# Patient Record
Sex: FEMALE | Race: WHITE | Employment: OTHER | URBAN - METROPOLITAN AREA
[De-identification: names, ages, dates, MRNs, and addresses within clinical notes are randomized per-mention and may not be internally consistent; named-entity substitution may affect disease eponyms.]

---

## 2017-01-11 ENCOUNTER — PATIENT MESSAGE (OUTPATIENT)
Dept: FAMILY MEDICINE CLINIC | Facility: CLINIC | Age: 72
End: 2017-01-11

## 2017-01-12 NOTE — TELEPHONE ENCOUNTER
From: Nata Cobos  To: Hussain Aragon DO  Sent: 1/11/2017 7:22 PM CST  Subject: Other    I got a flu shot this fall at [employer] [facility], provided by [facility] (location [place] and [facility] on October 15, 2016.

## 2017-01-18 ENCOUNTER — APPOINTMENT (OUTPATIENT)
Dept: LAB | Age: 72
End: 2017-01-18
Attending: FAMILY MEDICINE
Payer: MEDICARE

## 2017-01-18 DIAGNOSIS — E03.9 HYPOTHYROIDISM (ACQUIRED): ICD-10-CM

## 2017-01-18 LAB
FREE T4: 1.1 NG/DL (ref 0.9–1.8)
TSI SER-ACNC: 2.85 MIU/ML (ref 0.35–5.5)

## 2017-01-18 PROCEDURE — 84443 ASSAY THYROID STIM HORMONE: CPT

## 2017-01-18 PROCEDURE — 36415 COLL VENOUS BLD VENIPUNCTURE: CPT

## 2017-01-18 PROCEDURE — 84439 ASSAY OF FREE THYROXINE: CPT

## 2017-01-26 RX ORDER — LEVOTHYROXINE SODIUM 0.07 MG/1
TABLET ORAL
Qty: 90 TABLET | Refills: 0 | Status: SHIPPED | OUTPATIENT
Start: 2017-01-26 | End: 2017-04-04

## 2017-01-26 NOTE — TELEPHONE ENCOUNTER
Pt requesting refill on L thyroxine, protocol passed, refill approved    LOV 8/16/16    LF 11/9/16  #90    No future appointments.

## 2017-03-29 ENCOUNTER — PATIENT OUTREACH (OUTPATIENT)
Dept: FAMILY MEDICINE CLINIC | Facility: CLINIC | Age: 72
End: 2017-03-29

## 2017-03-29 NOTE — PROGRESS NOTES
Pt stated that she would schedule her appointment when she was in the office on her next visit on 4/4/17.

## 2017-04-04 ENCOUNTER — OFFICE VISIT (OUTPATIENT)
Dept: FAMILY MEDICINE CLINIC | Facility: CLINIC | Age: 72
End: 2017-04-04

## 2017-04-04 VITALS
RESPIRATION RATE: 16 BRPM | SYSTOLIC BLOOD PRESSURE: 118 MMHG | HEART RATE: 68 BPM | OXYGEN SATURATION: 96 % | BODY MASS INDEX: 25 KG/M2 | WEIGHT: 146 LBS | TEMPERATURE: 98 F | DIASTOLIC BLOOD PRESSURE: 60 MMHG

## 2017-04-04 DIAGNOSIS — M21.611 BUNION OF GREAT TOE OF RIGHT FOOT: ICD-10-CM

## 2017-04-04 DIAGNOSIS — E03.9 ACQUIRED HYPOTHYROIDISM: ICD-10-CM

## 2017-04-04 DIAGNOSIS — R10.2 PELVIC PAIN: ICD-10-CM

## 2017-04-04 DIAGNOSIS — N95.2 VAGINAL ATROPHY: Primary | ICD-10-CM

## 2017-04-04 DIAGNOSIS — F51.02 TRANSIENT INSOMNIA: ICD-10-CM

## 2017-04-04 DIAGNOSIS — J30.89 NON-SEASONAL ALLERGIC RHINITIS DUE TO OTHER ALLERGIC TRIGGER: ICD-10-CM

## 2017-04-04 PROCEDURE — 99214 OFFICE O/P EST MOD 30 MIN: CPT | Performed by: FAMILY MEDICINE

## 2017-04-04 RX ORDER — ALPRAZOLAM 0.25 MG/1
0.25 TABLET ORAL NIGHTLY PRN
Qty: 30 TABLET | Refills: 0 | COMMUNITY
Start: 2017-04-04 | End: 2018-05-10

## 2017-04-04 RX ORDER — ESTRADIOL 0.1 MG/G
1 CREAM VAGINAL WEEKLY
Qty: 42.5 G | Refills: 0 | Status: SHIPPED | OUTPATIENT
Start: 2017-04-04 | End: 2017-05-09

## 2017-04-04 RX ORDER — LEVOTHYROXINE SODIUM 88 UG/1
TABLET ORAL
Qty: 30 TABLET | Refills: 1 | Status: SHIPPED | OUTPATIENT
Start: 2017-04-04 | End: 2017-04-10

## 2017-04-04 RX ORDER — ESTRADIOL 0.1 MG/G
1 CREAM VAGINAL WEEKLY
Qty: 42.5 G | Refills: 0 | Status: CANCELLED | OUTPATIENT
Start: 2017-04-04

## 2017-04-04 RX ORDER — METHYLPREDNISOLONE 4 MG/1
TABLET ORAL
Qty: 21 TABLET | Refills: 0 | Status: SHIPPED | OUTPATIENT
Start: 2017-04-04 | End: 2017-05-09 | Stop reason: ALTCHOICE

## 2017-04-04 NOTE — PATIENT INSTRUCTIONS
Treating Arthritis in the Foot  If your symptoms are mild, medications may be enough to reduce pain and swelling. For more severe arthritis, surgery may be needed to improve the condition of the joint.     Medicine  Your doctor may prescribe medicine—pill © 4104-8223 [address], [address] [place]. All rights reserved. This information is not intended as a substitute for professional medical care. Always follow your healthcare professional's instructions.

## 2017-04-06 ENCOUNTER — APPOINTMENT (OUTPATIENT)
Dept: LAB | Age: 72
End: 2017-04-06
Attending: FAMILY MEDICINE
Payer: MEDICARE

## 2017-04-06 DIAGNOSIS — E03.9 ACQUIRED HYPOTHYROIDISM: ICD-10-CM

## 2017-04-06 PROCEDURE — 84439 ASSAY OF FREE THYROXINE: CPT | Performed by: FAMILY MEDICINE

## 2017-04-06 PROCEDURE — 84443 ASSAY THYROID STIM HORMONE: CPT | Performed by: FAMILY MEDICINE

## 2017-04-06 PROCEDURE — 36415 COLL VENOUS BLD VENIPUNCTURE: CPT | Performed by: FAMILY MEDICINE

## 2017-04-08 NOTE — PROGRESS NOTES
CHIEF COMPLAINT: Patient presents with: Follow - Up: medication       HPI:     Carina Gutierrez is a 66year old female presents for refill of Estrace vaginal cream which helps her vaginal atrophy.   Denies any urinary dysuria and has less pain with inte unspecified nature, other specified sites    • Other malaise and fatigue 12/10/2011   • Other screening mammogram 2011   • Herpes zoster with other nervous system complications    • Pap smear, as part of routine gynecological examination 2010   • Special s Disp: 30 tablet Rfl: 0   methylPREDNISolone 4 MG Oral Tab Dose as generic Medrol Dosepak Take as directed with food and water Disp: 21 tablet Rfl: 0   Estradiol (ESTRACE) 0.1 MG/GM Vaginal Cream Place 1 g vaginally once a week.  Notify physician if vaginal or edema bilaterally.   Right great foot first meta tarsal phalangeal joint mild tenderness and slight redness and no increased warmth.   Skin: no rashes, intact, dry  Neuro: AOx3.  No tremor.  Upper lower extremity DTRs +2 over 4 bilateral equal and symmet negative for gout/uric acid  - methylPREDNISolone 4 MG Oral Tab; Dose as generic Medrol Dosepak Take as directed with food and water  Dispense: 21 tablet;  Refill: 0      Meds This Visit:    Signed Prescriptions Disp Refills    Levothyroxine Sodium 88 MCG O

## 2017-04-10 DIAGNOSIS — E03.9 ACQUIRED HYPOTHYROIDISM: Primary | ICD-10-CM

## 2017-04-10 RX ORDER — LEVOTHYROXINE SODIUM 88 UG/1
TABLET ORAL
Qty: 90 TABLET | Refills: 0 | Status: SHIPPED | OUTPATIENT
Start: 2017-04-10 | End: 2017-07-24

## 2017-04-10 NOTE — TELEPHONE ENCOUNTER
Spoke with pt, reviewed results and recommendations, pt verbalized understanding      Notes Recorded by Rehan Hu DO on 4/10/2017 at 10:25 AM  TSH controlled slightly on lower end of normal. Needs repeat TSH in 3 mos- after July 16, 2017.  May need to

## 2017-04-12 ENCOUNTER — HOSPITAL ENCOUNTER (OUTPATIENT)
Dept: ULTRASOUND IMAGING | Facility: HOSPITAL | Age: 72
Discharge: HOME | End: 2017-04-12
Attending: FAMILY MEDICINE
Payer: MEDICARE

## 2017-04-12 DIAGNOSIS — R10.2 PELVIC PAIN: ICD-10-CM

## 2017-04-12 PROCEDURE — 76830 TRANSVAGINAL US NON-OB: CPT

## 2017-04-12 PROCEDURE — 76856 US EXAM PELVIC COMPLETE: CPT

## 2017-05-09 ENCOUNTER — OFFICE VISIT (OUTPATIENT)
Dept: FAMILY MEDICINE CLINIC | Facility: CLINIC | Age: 72
End: 2017-05-09

## 2017-05-09 VITALS
TEMPERATURE: 98 F | WEIGHT: 144.81 LBS | BODY MASS INDEX: 24.42 KG/M2 | DIASTOLIC BLOOD PRESSURE: 68 MMHG | RESPIRATION RATE: 17 BRPM | HEIGHT: 64.5 IN | SYSTOLIC BLOOD PRESSURE: 122 MMHG | OXYGEN SATURATION: 97 % | HEART RATE: 62 BPM

## 2017-05-09 DIAGNOSIS — F41.9 ANXIETY: ICD-10-CM

## 2017-05-09 DIAGNOSIS — Z13.220 ENCOUNTER FOR LIPID SCREENING FOR CARDIOVASCULAR DISEASE: ICD-10-CM

## 2017-05-09 DIAGNOSIS — Z12.31 ENCOUNTER FOR SCREENING MAMMOGRAM FOR MALIGNANT NEOPLASM OF BREAST: ICD-10-CM

## 2017-05-09 DIAGNOSIS — M21.619 BUNION OF GREAT TOE: ICD-10-CM

## 2017-05-09 DIAGNOSIS — N95.2 ATROPHIC VAGINITIS: ICD-10-CM

## 2017-05-09 DIAGNOSIS — M19.041 PRIMARY OSTEOARTHRITIS OF BOTH HANDS: ICD-10-CM

## 2017-05-09 DIAGNOSIS — Z13.6 ENCOUNTER FOR LIPID SCREENING FOR CARDIOVASCULAR DISEASE: ICD-10-CM

## 2017-05-09 DIAGNOSIS — E55.9 VITAMIN D DEFICIENCY: ICD-10-CM

## 2017-05-09 DIAGNOSIS — Z00.00 ENCOUNTER FOR ANNUAL HEALTH EXAMINATION: Primary | ICD-10-CM

## 2017-05-09 DIAGNOSIS — L84 CORNS AND CALLOSITIES: ICD-10-CM

## 2017-05-09 DIAGNOSIS — Z13.0 SCREENING FOR ENDOCRINE, NUTRITIONAL, METABOLIC AND IMMUNITY DISORDER: ICD-10-CM

## 2017-05-09 DIAGNOSIS — I44.7 LEFT BUNDLE BRANCH BLOCK (LBBB) ON ELECTROCARDIOGRAM: ICD-10-CM

## 2017-05-09 DIAGNOSIS — Z13.29 SCREENING FOR ENDOCRINE, NUTRITIONAL, METABOLIC AND IMMUNITY DISORDER: ICD-10-CM

## 2017-05-09 DIAGNOSIS — Z12.31 VISIT FOR SCREENING MAMMOGRAM: ICD-10-CM

## 2017-05-09 DIAGNOSIS — Z13.21 SCREENING FOR ENDOCRINE, NUTRITIONAL, METABOLIC AND IMMUNITY DISORDER: ICD-10-CM

## 2017-05-09 DIAGNOSIS — M85.859 OSTEOPENIA OF THIGH, UNSPECIFIED LATERALITY: ICD-10-CM

## 2017-05-09 DIAGNOSIS — M19.042 PRIMARY OSTEOARTHRITIS OF BOTH HANDS: ICD-10-CM

## 2017-05-09 DIAGNOSIS — Z13.228 SCREENING FOR ENDOCRINE, NUTRITIONAL, METABOLIC AND IMMUNITY DISORDER: ICD-10-CM

## 2017-05-09 PROCEDURE — G0009 ADMIN PNEUMOCOCCAL VACCINE: HCPCS | Performed by: FAMILY MEDICINE

## 2017-05-09 PROCEDURE — 90670 PCV13 VACCINE IM: CPT | Performed by: FAMILY MEDICINE

## 2017-05-09 PROCEDURE — G0439 PPPS, SUBSEQ VISIT: HCPCS | Performed by: FAMILY MEDICINE

## 2017-05-09 PROCEDURE — 96160 PT-FOCUSED HLTH RISK ASSMT: CPT | Performed by: FAMILY MEDICINE

## 2017-05-09 NOTE — PROGRESS NOTES
HPI:   Anup Jerome is a 66year old female who presents for a Medicare Initial Annual Wellness visit (Once after 12 month Medicare anniversery) . Right great toe bunion and pain greatly improved after recent Medrol Dosepak.   Moderate osteoarthr vaginal dryness or dyspareunia.  ×50 years. Monogamous. Denies pelvic pain or cramping. History of left bundle branch block-denies any chest pain or shortness of breath.   History of mitral valve prolapse 50 years ago denies any chest any weakn (VITAMIN D) 2000 UNITS Oral Cap Take  by mouth. MEDICAL INFORMATION:   She  has a past medical history of Personal history of estrogen therapy; Neoplasms of unspecified nature, other specified sites; Other malaise and fatigue (12/10/2011);  Other scree anxiety  HEMATOLOGIC: denies hx of anemia  ENDOCRINE: denies thyroid history  ALL/ASTHMA: denies hx of allergy or asthma    EXAM:   /68 mmHg  Pulse 62  Temp(Src) 98.1 °F (36.7 °C) (Oral)  Resp 17  Ht 64.5\"  Wt 144 lb 12.8 oz  BMI 24.48 kg/m2  SpO2 9 Immunization History   Administered Date(s) Administered   • Influenza 10/23/2008, 11/08/2010, 10/08/2013, 10/14/2014   • Influenza Vaccine, High Dose, Preserv Free 10/01/2015   • Pneumococcal (Prevnar 7) 10/23/2008   • Pneumovax 23 11/17/2015   • TD 0 Continue vit D          Prevent worse osteopenia          asymptomatic              Repeat vit d level        Atrophic vaginitis/ history of pelvic pain resolved        Recent pelvic ultrasound normal. No pelvic pain or abdominal pain since, no endometrial does the patient maintain a good energy level?: Appropriate Exercise;Daily Walks;Stretching    How would you describe your daily physical activity?: Moderate    How would you describe your current health state?: Good    How do you maintain positive mental 12 months? 0-No   Do you accidently lose urine? 0-No   Do you have difficulty seeing? 1-Yes   Do you have any difficulty walking or getting up? 0-No   Do you have any tripping hazards? 0-No   Are you on multiple medications?  1-Yes   Does pain affect your d Cardiovascular Disease Screening     LDL Annually LDL CHOLESTEROL (mg/dL)   Date Value   08/25/2016 96     LDL-CHOLESTEROL (mg/dL (calc))   Date Value   02/15/2011 87     LDL CHOLESTROL (mg/dL)   Date Value   08/02/2012 118        EKG - w/ Initial Pr previous visit.          SPECIFIC DISEASE MONITORING Internal Lab or Procedure External Lab or Procedure   Annual Monitoring of Persistent     Medications (ACE/ARB, digoxin diuretics, anticonvulsants.)    Potassium  Annually POTASSIUM (mmol/L)   Date Value

## 2017-05-09 NOTE — PATIENT INSTRUCTIONS
Please drop off copy of living will and power of  forms so we may scan into your medical file  Perform labs fasting 8 hours with water or black coffee or or black tea diet  soda only prior to exam- late July 2017.   Complete dexa and mammogram.  FU 188     TRIGLYCERIDES (mg/dL)   Date Value   08/25/2016 70   08/02/2012 72   02/15/2011 77        EKG - covered if needed at Welcome to Medicare, and non-screening if indicated for medical reasons Electrocardiogram date Routine EKG is not a screening cover 21-65 or Pap+HPV every 5 yrs age 33-67, Covered every 2 yrs up to age 79 or Yearly if High Risk   There are no preventive care reminders to display for this patient.      Chlamydia  Annually if high risk No results found for: CHLAMYDIA No flowsheet data fou http://www. idph.state. [place]/public/books/advin.htm  A link to the [employer]. This site has a lot of good information including definitions of the different types of Advance Directives.  It also has the State forms available on it's webs

## 2017-05-19 ENCOUNTER — HOSPITAL ENCOUNTER (OUTPATIENT)
Dept: BONE DENSITY | Age: 72
Discharge: HOME | End: 2017-05-19
Attending: FAMILY MEDICINE
Payer: MEDICARE

## 2017-05-19 DIAGNOSIS — M85.859 OSTEOPENIA OF THIGH, UNSPECIFIED LATERALITY: ICD-10-CM

## 2017-05-19 PROCEDURE — 77080 DXA BONE DENSITY AXIAL: CPT | Performed by: FAMILY MEDICINE

## 2017-05-24 ENCOUNTER — TELEPHONE (OUTPATIENT)
Dept: FAMILY MEDICINE CLINIC | Facility: CLINIC | Age: 72
End: 2017-05-24

## 2017-05-24 NOTE — TELEPHONE ENCOUNTER
Left detailed message with results as permitted on consent.  Office hours and phone # given if pt has questions    Notes Recorded by Thompson Lee DO on 5/23/2017 at 10:29 PM  Osteopenia of femoral neck.  Lumbar spine are within normal range improved from

## 2017-07-13 ENCOUNTER — APPOINTMENT (OUTPATIENT)
Dept: LAB | Age: 72
End: 2017-07-13
Attending: FAMILY MEDICINE
Payer: MEDICARE

## 2017-07-13 DIAGNOSIS — Z13.6 ENCOUNTER FOR LIPID SCREENING FOR CARDIOVASCULAR DISEASE: ICD-10-CM

## 2017-07-13 DIAGNOSIS — E03.9 ACQUIRED HYPOTHYROIDISM: ICD-10-CM

## 2017-07-13 DIAGNOSIS — Z13.220 ENCOUNTER FOR LIPID SCREENING FOR CARDIOVASCULAR DISEASE: ICD-10-CM

## 2017-07-13 LAB
ALBUMIN SERPL-MCNC: 3.9 G/DL (ref 3.5–4.8)
ALP LIVER SERPL-CCNC: 59 U/L (ref 55–142)
ALT SERPL-CCNC: 27 U/L (ref 14–54)
AST SERPL-CCNC: 18 U/L (ref 15–41)
BILIRUB SERPL-MCNC: 1 MG/DL (ref 0.1–2)
BUN BLD-MCNC: 16 MG/DL (ref 8–20)
CALCIUM BLD-MCNC: 9.4 MG/DL (ref 8.3–10.3)
CHLORIDE: 106 MMOL/L (ref 101–111)
CHOLEST SMN-MCNC: 164 MG/DL (ref ?–200)
CO2: 27 MMOL/L (ref 22–32)
CREAT BLD-MCNC: 0.71 MG/DL (ref 0.55–1.02)
FREE T4: 1.2 NG/DL (ref 0.9–1.8)
GLUCOSE BLD-MCNC: 71 MG/DL (ref 70–99)
HDLC SERPL-MCNC: 60 MG/DL (ref 45–?)
HDLC SERPL: 2.73 {RATIO} (ref ?–4.44)
LDLC SERPL CALC-MCNC: 86 MG/DL (ref ?–130)
M PROTEIN MFR SERPL ELPH: 7.1 G/DL (ref 6.1–8.3)
NONHDLC SERPL-MCNC: 104 MG/DL (ref ?–130)
POTASSIUM SERPL-SCNC: 4 MMOL/L (ref 3.6–5.1)
SODIUM SERPL-SCNC: 141 MMOL/L (ref 136–144)
TRIGLYCERIDES: 91 MG/DL (ref ?–150)
TSI SER-ACNC: 1.84 MIU/ML (ref 0.35–5.5)
VLDL: 18 MG/DL (ref 5–40)

## 2017-07-13 PROCEDURE — 36415 COLL VENOUS BLD VENIPUNCTURE: CPT | Performed by: FAMILY MEDICINE

## 2017-07-13 PROCEDURE — 80061 LIPID PANEL: CPT | Performed by: FAMILY MEDICINE

## 2017-07-13 PROCEDURE — 84439 ASSAY OF FREE THYROXINE: CPT | Performed by: FAMILY MEDICINE

## 2017-07-13 PROCEDURE — 84443 ASSAY THYROID STIM HORMONE: CPT | Performed by: FAMILY MEDICINE

## 2017-07-13 PROCEDURE — 80053 COMPREHEN METABOLIC PANEL: CPT | Performed by: FAMILY MEDICINE

## 2017-07-24 DIAGNOSIS — E03.9 ACQUIRED HYPOTHYROIDISM: ICD-10-CM

## 2017-07-24 RX ORDER — LEVOTHYROXINE SODIUM 88 UG/1
TABLET ORAL
Qty: 90 TABLET | Refills: 0 | Status: SHIPPED | OUTPATIENT
Start: 2017-07-24 | End: 2017-10-09

## 2017-08-28 ENCOUNTER — HOSPITAL ENCOUNTER (OUTPATIENT)
Dept: MAMMOGRAPHY | Age: 72
Discharge: HOME | End: 2017-08-28
Attending: FAMILY MEDICINE
Payer: MEDICARE

## 2017-08-28 DIAGNOSIS — Z12.31 ENCOUNTER FOR SCREENING MAMMOGRAM FOR MALIGNANT NEOPLASM OF BREAST: ICD-10-CM

## 2017-08-28 PROCEDURE — 77063 BREAST TOMOSYNTHESIS BI: CPT | Performed by: FAMILY MEDICINE

## 2017-08-28 PROCEDURE — 77067 SCR MAMMO BI INCL CAD: CPT | Performed by: FAMILY MEDICINE

## 2017-10-09 DIAGNOSIS — E03.9 ACQUIRED HYPOTHYROIDISM: ICD-10-CM

## 2017-10-09 RX ORDER — LEVOTHYROXINE SODIUM 88 UG/1
TABLET ORAL
Qty: 90 TABLET | Refills: 0 | Status: SHIPPED | OUTPATIENT
Start: 2017-10-09 | End: 2018-01-13

## 2017-10-30 ENCOUNTER — OFFICE VISIT (OUTPATIENT)
Dept: FAMILY MEDICINE CLINIC | Facility: CLINIC | Age: 72
End: 2017-10-30

## 2017-10-30 ENCOUNTER — MED REC SCAN ONLY (OUTPATIENT)
Dept: FAMILY MEDICINE CLINIC | Facility: CLINIC | Age: 72
End: 2017-10-30

## 2017-10-30 DIAGNOSIS — Z23 NEED FOR VACCINATION: Primary | ICD-10-CM

## 2017-10-30 PROCEDURE — 90653 IIV ADJUVANT VACCINE IM: CPT | Performed by: FAMILY MEDICINE

## 2017-10-30 PROCEDURE — G0008 ADMIN INFLUENZA VIRUS VAC: HCPCS | Performed by: FAMILY MEDICINE

## 2018-01-13 DIAGNOSIS — E03.9 ACQUIRED HYPOTHYROIDISM: ICD-10-CM

## 2018-01-15 RX ORDER — LEVOTHYROXINE SODIUM 88 UG/1
TABLET ORAL
Qty: 90 TABLET | Refills: 0 | Status: SHIPPED | OUTPATIENT
Start: 2018-01-15 | End: 2018-04-06

## 2018-01-15 NOTE — TELEPHONE ENCOUNTER
Pt requesting refill on Levothyroxine, protocol passed, refill approved    LOV 5/9/17    LF 10/9/17    No future appointments.

## 2018-04-06 DIAGNOSIS — E03.9 ACQUIRED HYPOTHYROIDISM: ICD-10-CM

## 2018-04-09 RX ORDER — LEVOTHYROXINE SODIUM 88 UG/1
88 TABLET ORAL
Qty: 90 TABLET | Refills: 0 | Status: SHIPPED
Start: 2018-04-09 | End: 2018-05-10

## 2018-04-09 NOTE — TELEPHONE ENCOUNTER
Pt requesting refill on Levothyroxine, protocol passed, refill approved    LOV 5/9/17    LF 1/15/18    No future appointments.

## 2018-04-09 NOTE — TELEPHONE ENCOUNTER
From: Adithya Sanders  Sent: 4/6/2018 6:02 PM CDT  Subject: Medication Renewal Request    [id number] [facility]  Kelley Reed would like a refill of the following medications:     LEVOTHYROXINE SODIUM 88 MCG Oral Tab Antonina Angelucci, DO]    Preferred pharmacy: [address] [place] [address]

## 2018-05-10 ENCOUNTER — OFFICE VISIT (OUTPATIENT)
Dept: FAMILY MEDICINE CLINIC | Facility: CLINIC | Age: 73
End: 2018-05-10

## 2018-05-10 VITALS
HEART RATE: 62 BPM | TEMPERATURE: 98 F | DIASTOLIC BLOOD PRESSURE: 68 MMHG | HEIGHT: 64.5 IN | WEIGHT: 144 LBS | BODY MASS INDEX: 24.29 KG/M2 | OXYGEN SATURATION: 98 % | RESPIRATION RATE: 18 BRPM | SYSTOLIC BLOOD PRESSURE: 120 MMHG

## 2018-05-10 DIAGNOSIS — N95.2 VAGINAL ATROPHY: ICD-10-CM

## 2018-05-10 DIAGNOSIS — Z66 DNR (DO NOT RESUSCITATE): ICD-10-CM

## 2018-05-10 DIAGNOSIS — E03.9 ACQUIRED HYPOTHYROIDISM: ICD-10-CM

## 2018-05-10 DIAGNOSIS — M85.859 OSTEOPENIA OF HIP, UNSPECIFIED LATERALITY: ICD-10-CM

## 2018-05-10 DIAGNOSIS — R53.83 OTHER FATIGUE: ICD-10-CM

## 2018-05-10 DIAGNOSIS — F43.22 ADJUSTMENT DISORDER WITH ANXIOUS MOOD: ICD-10-CM

## 2018-05-10 DIAGNOSIS — F51.02 TRANSIENT INSOMNIA: ICD-10-CM

## 2018-05-10 DIAGNOSIS — Z13.6 SCREENING FOR ISCHEMIC HEART DISEASE (IHD): ICD-10-CM

## 2018-05-10 DIAGNOSIS — Z00.00 ENCOUNTER FOR ANNUAL HEALTH EXAMINATION: Primary | ICD-10-CM

## 2018-05-10 DIAGNOSIS — E55.9 VITAMIN D DEFICIENCY: ICD-10-CM

## 2018-05-10 DIAGNOSIS — F41.9 ANXIETY: ICD-10-CM

## 2018-05-10 DIAGNOSIS — M21.619 BUNION OF GREAT TOE: ICD-10-CM

## 2018-05-10 DIAGNOSIS — Z12.39 SCREENING FOR MALIGNANT NEOPLASM OF BREAST: ICD-10-CM

## 2018-05-10 DIAGNOSIS — M19.041 PRIMARY OSTEOARTHRITIS OF BOTH HANDS: ICD-10-CM

## 2018-05-10 DIAGNOSIS — M19.042 PRIMARY OSTEOARTHRITIS OF BOTH HANDS: ICD-10-CM

## 2018-05-10 PROCEDURE — 96160 PT-FOCUSED HLTH RISK ASSMT: CPT | Performed by: FAMILY MEDICINE

## 2018-05-10 PROCEDURE — G0439 PPPS, SUBSEQ VISIT: HCPCS | Performed by: FAMILY MEDICINE

## 2018-05-10 RX ORDER — SENNOSIDES 8.6 MG
650 CAPSULE ORAL EVERY 8 HOURS PRN
Qty: 60 TABLET | Refills: 0 | COMMUNITY
Start: 2018-05-10 | End: 2019-05-17

## 2018-05-10 RX ORDER — LEVOTHYROXINE SODIUM 88 UG/1
88 TABLET ORAL
Qty: 90 TABLET | Refills: 0 | Status: SHIPPED | OUTPATIENT
Start: 2018-05-10 | End: 2019-01-04

## 2018-05-10 RX ORDER — CLONAZEPAM 0.25 MG/1
0.25 TABLET, ORALLY DISINTEGRATING ORAL 2 TIMES DAILY PRN
Qty: 30 TABLET | Refills: 0 | Status: SHIPPED | OUTPATIENT
Start: 2018-05-10 | End: 2020-05-19 | Stop reason: ALTCHOICE

## 2018-05-10 RX ORDER — ESTRADIOL 0.1 MG/G
1 CREAM VAGINAL WEEKLY
Qty: 42.5 G | Refills: 0 | Status: SHIPPED | OUTPATIENT
Start: 2018-05-10 | End: 2021-04-13

## 2018-05-10 NOTE — PROGRESS NOTES
HPI:   Marlene Rodriguez is a 74year old female who presents for a MA (Medicare Advantage) [facility] (Once per calendar year). Acquired hypothyroidism-normally feels great lately has been slightly more tired.   Taking levothyroxine on an empty st  and monogamous. Denies any problems urinating or pain with urinating vaginal burning or itching. Other fatigue-recent mild fatigue. Unsure if related to thyroid. Due for repeat TSH annually    Osteopenia-due 5/2019 for repeat DEXA scan.   On Needs to bring in. She smoked tobacco in the past but quit greater than 12 months ago.   Smoking status: Former Smoker                                                              Packs/day: 0.30      Years: 2.00         Types: Cigarettes     Quit d CR Take 1 tablet (650 mg total) by mouth every 8 (eight) hours as needed for Pain. Max use 6 tabs or 4000mg in 24 hours   Estradiol (ESTRACE) 0.1 MG/GM Vaginal Cream Place 1 g vaginally once a week.  Notify physician if vaginal bleeding   Cetirizine HCl (ZY tobacco. She reports that she drinks alcohol. She reports that she does not use drugs.      REVIEW OF SYSTEMS:   GENERAL: feels well otherwise, some recent fatigue  SKIN: denies any unusual skin lesions  EYES: denies blurred vision or double vision  HEENT: adenopathy;  thyroid: not enlarged, symmetric, no tenderness/mass/nodules; no carotid bruit or JVD   Back:   Symmetric, no curvature, ROM normal, no CVA tenderness   Lungs:   Clear to auscultation bilaterally, respirations unlabored   Heart:  Regular rate MCG Oral Tab;  Take 1 tablet (88 mcg total) by mouth before breakfast.  -     TSH+FREE T4; Future    Bunion of great toe   Stable, does not want surgery at this point denies any redness or pain will treat symptomatically    Screening for malignant neoplasm appt ASAP- risk of endometrial cancer.  Pt understands    Other fatigue   Check thyroid-TSH- suspect due to stress with son  -     CBC WITH DIFFERENTIAL WITH PLATELET  -     COMP METABOLIC PANEL (14)    Screening for ischemic heart disease (IHD)  -     LIPI Physical Exam only, or if medically necessary Electrocardiogram date       Colorectal Cancer Screening      Colonoscopy Screen every 10 years Colonoscopy,10 Years due on 03/12/2023 Update Health Maintenance if applicable    Flex Sigmoidoscopy Screen every This may be covered with your prescription benefits, but Medicare does not cover unless Medically needed    Zoster  Not covered by Medicare Part B No vaccine history found This may be covered with your pharmacy  prescription benefits                    Tem

## 2018-05-10 NOTE — PATIENT INSTRUCTIONS
· Please bring in immunizations so we may update for upcoming travel to [facility] and [place] 4/2019. · Please bring in copy of power of  so we may scan to your medical record.   · Perform labs fasting 8 hours with water or black coffee or or black tea d 164     TRIGLYCERIDES (mg/dL)   Date Value   08/02/2012 72   02/15/2011 77     Triglycerides (mg/dL)   Date Value   07/13/2017 91        EKG - covered if needed at Welcome to Medicare, and non-screening if indicated for medical reasons Electrocardiogram da and Pelvic      Pap: Every 3 yrs age 21-65 or Pap+HPV every 5 yrs age 33-67, Covered every 2 yrs up to age 79 or Yearly if High Risk   There are no preventive care reminders to display for this patient.      Chlamydia  Annually if high risk No results found [employer] [facility]. http://www. idph.[facility]. [place]/public/books/advin.htm  A link to the [employer]. This site has a lot of good information including definitions of the different types of Advance Directives.  It als may be at risk for fractures. With age, the quality and quantity of bone declines. You can lessen bone loss by staying active and increasing your calcium intake.  Calcium supplements and other osteoporosis treatments do have risks, so talk to your healthcar on brand and size.   Daily calcium needs  13-22 years old: 1,300 mg  2330 years old: 1,000 mg  31-55 years old: 1,000 mg  53-79 years old, women: 1,200 mg  53-79 years old, men: 1,000 mg  Pregnant or nursin-34 years old: 1,300 mg, 24-51 years old: 1,0 [address]. All rights reserved. This information is not intended as a substitute for professional medical care. Always follow your healthcare professional's instructions. Vitamin D  Does this test have other names?   25-hydroxyvitamin D (25-high-DROX-ee- check your parathyroid hormone levels and your calcium levels.   What do my test results mean?   A result for a lab test may be affected by many things, including the method the laboratory uses to do the test. If your test results are different from the nor that don't need a prescription and any illicit drugs you may use.   © 9875-0969 [employer] [address]. [address] [facility], [facility] [place]. All rights reserved. This information is not intended as a substitute for professional medical care.  Bridget

## 2018-05-17 ENCOUNTER — TELEPHONE (OUTPATIENT)
Dept: FAMILY MEDICINE CLINIC | Facility: CLINIC | Age: 73
End: 2018-05-17

## 2018-05-17 NOTE — TELEPHONE ENCOUNTER
Prior auth required for Clonazepam. Submitted via cover [employer] meds.     Approved 4/16/18 to 5/17/19    Pharmacy notified

## 2018-05-21 ENCOUNTER — TELEPHONE (OUTPATIENT)
Dept: FAMILY MEDICINE CLINIC | Facility: CLINIC | Age: 73
End: 2018-05-21

## 2018-05-22 ENCOUNTER — OFFICE VISIT (OUTPATIENT)
Dept: FAMILY MEDICINE CLINIC | Facility: CLINIC | Age: 73
End: 2018-05-22

## 2018-05-22 VITALS
HEART RATE: 59 BPM | TEMPERATURE: 98 F | RESPIRATION RATE: 18 BRPM | BODY MASS INDEX: 24 KG/M2 | DIASTOLIC BLOOD PRESSURE: 68 MMHG | WEIGHT: 144.19 LBS | SYSTOLIC BLOOD PRESSURE: 104 MMHG | OXYGEN SATURATION: 98 %

## 2018-05-22 DIAGNOSIS — M85.859 OSTEOPENIA OF HIP, UNSPECIFIED LATERALITY: Primary | ICD-10-CM

## 2018-05-22 DIAGNOSIS — Z23 NEED FOR VACCINATION: ICD-10-CM

## 2018-05-22 DIAGNOSIS — Z29.8 NEED FOR MALARIA PROPHYLAXIS: ICD-10-CM

## 2018-05-22 DIAGNOSIS — E03.9 ACQUIRED HYPOTHYROIDISM: ICD-10-CM

## 2018-05-22 DIAGNOSIS — Z71.84 ENCOUNTER FOR COUNSELING FOR TRAVEL: ICD-10-CM

## 2018-05-22 PROCEDURE — 99214 OFFICE O/P EST MOD 30 MIN: CPT | Performed by: FAMILY MEDICINE

## 2018-05-22 RX ORDER — ATOVAQUONE AND PROGUANIL HYDROCHLORIDE 250; 100 MG/1; MG/1
TABLET, FILM COATED ORAL
Qty: 30 TABLET | Refills: 0 | Status: SHIPPED | OUTPATIENT
Start: 2018-05-22 | End: 2019-05-01 | Stop reason: ALTCHOICE

## 2018-05-22 NOTE — PROGRESS NOTES
CHIEF COMPLAINT: Patient presents with: Follow - Up: labs/ travel plans    HPI:     John Marion is a 74year old female presents for follow-up labs and discuss travel plan. Plan to travel to [place] and April 2019.  Also, tour of the [place] History   Problem Relation Age of Onset   • Dementia Father    • Diabetes Father      mellitus   • Hypertension Father    • Hypertension Mother    • Breast Cancer Mother 80     brca at age 80, passed away at age 80. possible trauma as cause.    • Other [OT (ZYRTEC ALLERGY) 10 MG Oral Tab Take 10 mg by mouth daily. Disp:  Rfl:    Multiple Vitamins-Minerals (EYE VITAMINS OR) Take  by mouth. Disp:  Rfl:    Calcium 600 MG Oral Tab Take 600 mg by mouth daily.    Disp:  Rfl:    Multiple Vitamin (MULTI-VITAMIN OR) T bilateral equal and symmetric.  Normal gait. no resting tremor. Psych: Normal affect.  Euthymic.  Normal speech.  Good eye contact.       LABS     Office Visit on 05/10/2018   Component Date Value   • GLUCOSE 05/15/2018 97    • UREA NITROGEN (BUN) 05/15/201 05/15/2018 1.4    • TSH 05/15/2018 2.89     PATIENT STATED HISTORY: (As transcribed by Technologist)  Dexa Bone Density  Osteopenia of thigh, unspecified laterally.          LUMBAR SPINE ANALYSIS RESULTS:    Lumbar T-Score:                       0.8  Percen Atovaquone-Proguanil HCl 250-100 MG Oral Tab; 1 tab po daily start 2 days before travel and take 1 daily p.o. Daily during travel and for 1 week post travel  Dispense: 30 tablet;  Refill: 0      Meds This Visit:    Signed Prescriptions Disp Refills    Hepat insomnia     Other fatigue      Imaging & Referrals:  None     5/22/2018  Joshua Miranda, DO      Patient understands plan and follow-up. Return in about 6 months (around 11/22/2018) for recheck travel visit- immunizations.

## 2018-05-24 ENCOUNTER — TELEPHONE (OUTPATIENT)
Dept: FAMILY MEDICINE CLINIC | Facility: CLINIC | Age: 73
End: 2018-05-24

## 2018-05-24 NOTE — TELEPHONE ENCOUNTER
Patient will need to complete tablet typhoid 1 week prior to travel. I have printed out the vaccine order and she may take to [facility]. Typically they have it in stock. The CDC  vaccination interval recommendation currently is every 5 years.   Please in

## 2018-06-04 ENCOUNTER — TELEPHONE (OUTPATIENT)
Dept: FAMILY MEDICINE CLINIC | Facility: CLINIC | Age: 73
End: 2018-06-04

## 2018-06-04 NOTE — TELEPHONE ENCOUNTER
Spoke with [facility], Pt can receive vaccines [facility]. Claim ID is [id number]     Authorized 5/5/18 to 6/4/19    Left message to notify pt

## 2018-07-13 DIAGNOSIS — E03.9 ACQUIRED HYPOTHYROIDISM: ICD-10-CM

## 2018-07-13 RX ORDER — LEVOTHYROXINE SODIUM 88 UG/1
TABLET ORAL
Qty: 90 TABLET | Refills: 0 | Status: SHIPPED | OUTPATIENT
Start: 2018-07-13 | End: 2018-10-01

## 2018-07-13 NOTE — TELEPHONE ENCOUNTER
Pt requesting a refill of Synthroid, protocol passed, Refill approved.     LOV 5/22/18  LF 5/10/18 #90 with 0 refills    Future Appointments  Date Time Provider Jamal Murillo   11/26/2018 1:00 PM Charleen Eden,  [facility] [place]

## 2018-08-14 ENCOUNTER — TELEPHONE (OUTPATIENT)
Dept: FAMILY MEDICINE CLINIC | Facility: CLINIC | Age: 73
End: 2018-08-14

## 2018-08-14 NOTE — TELEPHONE ENCOUNTER
Left message for pt, informed her of mammogram order on file and provided # to schedule.  Office # and hours provided for questions

## 2018-08-14 NOTE — TELEPHONE ENCOUNTER
Patient called wanting to schedule a Mammogram but she wasn't sure if she need a referral please call her at [phone]

## 2018-09-07 ENCOUNTER — HOSPITAL ENCOUNTER (OUTPATIENT)
Dept: MAMMOGRAPHY | Age: 73
Discharge: HOME | End: 2018-09-07
Attending: FAMILY MEDICINE
Payer: MEDICARE

## 2018-09-07 DIAGNOSIS — Z12.39 SCREENING FOR MALIGNANT NEOPLASM OF BREAST: ICD-10-CM

## 2018-09-07 PROCEDURE — 77067 SCR MAMMO BI INCL CAD: CPT | Performed by: FAMILY MEDICINE

## 2018-09-07 PROCEDURE — 77063 BREAST TOMOSYNTHESIS BI: CPT | Performed by: FAMILY MEDICINE

## 2018-10-01 DIAGNOSIS — E03.9 ACQUIRED HYPOTHYROIDISM: ICD-10-CM

## 2018-10-01 RX ORDER — LEVOTHYROXINE SODIUM 88 UG/1
TABLET ORAL
Qty: 90 TABLET | Refills: 0 | Status: SHIPPED | OUTPATIENT
Start: 2018-10-01 | End: 2018-11-26

## 2018-10-01 NOTE — TELEPHONE ENCOUNTER
Pt requesting refill on Levothyroxine, protocol passed, refill approved    LOV 5/22/18    LF 7/13/18    Future Appointments   Date Time Provider Jamal Murillo   11/26/2018  1:00 PM Claudia Browning DO [facility] [place]

## 2018-11-26 ENCOUNTER — TELEPHONE (OUTPATIENT)
Dept: FAMILY MEDICINE CLINIC | Facility: CLINIC | Age: 73
End: 2018-11-26

## 2018-11-26 ENCOUNTER — OFFICE VISIT (OUTPATIENT)
Dept: FAMILY MEDICINE CLINIC | Facility: CLINIC | Age: 73
End: 2018-11-26
Payer: MEDICARE

## 2018-11-26 VITALS
OXYGEN SATURATION: 98 % | SYSTOLIC BLOOD PRESSURE: 110 MMHG | RESPIRATION RATE: 20 BRPM | HEIGHT: 64.5 IN | TEMPERATURE: 98 F | HEART RATE: 72 BPM | BODY MASS INDEX: 21.59 KG/M2 | DIASTOLIC BLOOD PRESSURE: 62 MMHG | WEIGHT: 128 LBS

## 2018-11-26 DIAGNOSIS — R14.3 FLATULENCE/WIND: ICD-10-CM

## 2018-11-26 DIAGNOSIS — Z71.84 COUNSELING ABOUT TRAVEL: Primary | ICD-10-CM

## 2018-11-26 DIAGNOSIS — R63.4 WEIGHT LOSS, NON-INTENTIONAL: ICD-10-CM

## 2018-11-26 PROCEDURE — 99214 OFFICE O/P EST MOD 30 MIN: CPT | Performed by: FAMILY MEDICINE

## 2018-11-26 RX ORDER — ACETAZOLAMIDE 125 MG/1
TABLET ORAL
Qty: 30 TABLET | Refills: 0 | Status: SHIPPED | OUTPATIENT
Start: 2018-04-01 | End: 2018-11-26

## 2018-11-26 NOTE — TELEPHONE ENCOUNTER
Informed pt of both pharmacy and MD recommendations. Pt states a referral will not be necessary at this time

## 2018-11-26 NOTE — PROGRESS NOTES
CHIEF COMPLAINT: Patient presents with: Follow - Up: travel/meds    HPI:     Carin Nobles is a 74year old female presents for discuss immunizations.   Started hepatitis B series had injection and of July 2018 then 1 like later in August at second he 12/10/2011   • Other screening mammogram 2011   • Pap smear, as part of routine gynecological examination 2010   • Personal history of estrogen therapy    • Seasonal allergies    • Special screening for osteoporosis 02/15/2011      Past Surgical History: (MULTI-VITAMIN OR) Take  by mouth. Disp:  Rfl:    Cholecalciferol (VITAMIN D) 2000 UNITS Oral Cap Take 4,000 Units by mouth. Disp:  Rfl:    Atovaquone-Proguanil HCl 250-100 MG Oral Tab 1 tab po daily start 2 days before travel and take 1 daily p.o.  Daily HSM  Extremities: No cyanosis, clubbing, or edema bilaterally. Skin: no acute rashes  Neuro: AOx3. Normal gait    LABS     No visits with results within 2 Month(s) from this visit.    Latest known visit with results is:   Office Visit on 05/10/2018   Com REPORT ALWAYS MESSAGE$SI* 05/15/2018     • COMMENT 05/15/2018     • T4, FREE 05/15/2018 1.4    • TSH 05/15/2018 2.89       REVIEWED THIS VISIT  ASSESSMENT/PLAN:   74year old female with    1.  Counseling about travel  Recommended shingrix vaccine-more effe Risk Completed      Patient/Caregiver Education: Patient/Caregiver Education: There are no barriers to learning. Medical education done. Outcome: Patient verbalizes understanding.  Patient is notified to call with any questions, comp lications, allergies,

## 2018-11-26 NOTE — TELEPHONE ENCOUNTER
Spoke to pharmacy at request of MD regarding interaction of acetazolamide with pt with sulfa allergy. Pharmacy stated Acetazolamide contains sulfa group therefore MD should refrain from RX depending on severity of allergy.     Informed MD, stated to inform

## 2018-11-27 NOTE — TELEPHONE ENCOUNTER
Pt completed Colonoscopy 3/12/13    Report is under \"notes\" tab from Dr Christine Tesfaye dated 4/23/13    Normal Colonoscopy, repeat 10 years

## 2019-01-04 DIAGNOSIS — E03.9 ACQUIRED HYPOTHYROIDISM: ICD-10-CM

## 2019-01-04 RX ORDER — LEVOTHYROXINE SODIUM 88 UG/1
TABLET ORAL
Qty: 90 TABLET | Refills: 0 | Status: SHIPPED | OUTPATIENT
Start: 2019-01-04 | End: 2019-03-18

## 2019-01-04 NOTE — TELEPHONE ENCOUNTER
Pt requesting a refill of levothyroxine, protocol passed. Refill approved. LOV with PCP 11/26/18    LF 5/10/18 #90    No future appointments.

## 2019-01-08 ENCOUNTER — PATIENT MESSAGE (OUTPATIENT)
Dept: FAMILY MEDICINE CLINIC | Facility: CLINIC | Age: 74
End: 2019-01-08

## 2019-01-08 NOTE — TELEPHONE ENCOUNTER
From: John Marion  To: Jay Quiñones DO  Sent: 1/8/2019 1:10 PM CST  Subject: Other    Concerning the Hepatitis B series: during my last office visit (November) there was concern about the first two shots having been administered one week apart.  I

## 2019-03-18 DIAGNOSIS — E03.9 ACQUIRED HYPOTHYROIDISM: ICD-10-CM

## 2019-03-18 RX ORDER — LEVOTHYROXINE SODIUM 88 UG/1
TABLET ORAL
Qty: 90 TABLET | Refills: 0 | Status: SHIPPED | OUTPATIENT
Start: 2019-03-18 | End: 2019-07-10

## 2019-03-18 NOTE — TELEPHONE ENCOUNTER
Pt requesting refill of Synthroid, passed protocol , refill approved, sent to pharmacy:     Last Time Medication was Filled:  1/4/19 #90    Last Office Visit with PCP: 11/26/18    No future appointments.

## 2019-05-01 ENCOUNTER — OFFICE VISIT (OUTPATIENT)
Dept: FAMILY MEDICINE CLINIC | Facility: CLINIC | Age: 74
End: 2019-05-01
Payer: MEDICARE

## 2019-05-01 VITALS
TEMPERATURE: 98 F | OXYGEN SATURATION: 99 % | SYSTOLIC BLOOD PRESSURE: 124 MMHG | DIASTOLIC BLOOD PRESSURE: 60 MMHG | BODY MASS INDEX: 22.26 KG/M2 | RESPIRATION RATE: 16 BRPM | HEIGHT: 64.5 IN | HEART RATE: 74 BPM | WEIGHT: 132 LBS

## 2019-05-01 DIAGNOSIS — J02.9 PHARYNGITIS, UNSPECIFIED ETIOLOGY: Primary | ICD-10-CM

## 2019-05-01 PROCEDURE — 99213 OFFICE O/P EST LOW 20 MIN: CPT | Performed by: PHYSICIAN ASSISTANT

## 2019-05-01 PROCEDURE — 87880 STREP A ASSAY W/OPTIC: CPT | Performed by: PHYSICIAN ASSISTANT

## 2019-05-01 RX ORDER — FLUTICASONE PROPIONATE 50 MCG
2 SPRAY, SUSPENSION (ML) NASAL DAILY
Qty: 1 INHALER | Refills: 0 | Status: SHIPPED | OUTPATIENT
Start: 2019-05-01 | End: 2021-04-13

## 2019-05-01 NOTE — PATIENT INSTRUCTIONS
Viral Pharyngitis (Sore Throat)    You (or your child, if your child is the patient) have pharyngitis (sore throat). This infection is caused by a virus. It can cause throat pain that is worse when swallowing, aching all over, headache, and fever.  The in · Fever as directed by your doctor.  For children, seek care if:  ¨ Your child is of any age and has repeated fevers above 104°F (40°C). ¨ Your child is younger than 3years of age and has a fever of 100.4°F (38°C) that continues for more than 1 day.   Monalisa Saldana Use Medication for More Relief  Over-the-counter medication can reduce sore throat symptoms. Ask your pharmacist if you have questions about which medication to use:  · Ease pain with anesthetic sprays. Aspirin or an aspirin substitute also helps.  Remember

## 2019-05-01 NOTE — PROGRESS NOTES
CHIEF COMPLAINT:   Patient presents with:  Sore Throat        HPI:   Alfred Clarke is 74year old female presents to clinic with complaint of  sore throat.   Symptoms 3 day(s)    Associated symptoms: no fevers, no chest congestion, mild nasal congest 2011    acquired   • Left bundle branch block (LBBB) on electrocardiogram 6/29/2012   • Neoplasms of unspecified nature, other specified sites    • Normal spontaneous vaginal delivery    • Osteoarthritis    • Other malaise and fatigue 12/10/2011   • Other lymphadenopathy.     Recent Results (from the past 24 hour(s))   STREP A ASSAY W/OPTIC    Collection Time: 05/01/19  8:32 AM   Result Value Ref Range    Strep Grp A Screen negative Negative    Control Line Present with a clear background (yes/no) yes Yes/No

## 2019-05-10 ENCOUNTER — OFFICE VISIT (OUTPATIENT)
Dept: FAMILY MEDICINE CLINIC | Facility: CLINIC | Age: 74
End: 2019-05-10
Payer: MEDICARE

## 2019-05-10 VITALS
OXYGEN SATURATION: 98 % | DIASTOLIC BLOOD PRESSURE: 80 MMHG | TEMPERATURE: 98 F | RESPIRATION RATE: 20 BRPM | SYSTOLIC BLOOD PRESSURE: 138 MMHG | HEART RATE: 82 BPM

## 2019-05-10 DIAGNOSIS — J01.00 ACUTE MAXILLARY SINUSITIS, RECURRENCE NOT SPECIFIED: Primary | ICD-10-CM

## 2019-05-10 DIAGNOSIS — R05.9 COUGH: ICD-10-CM

## 2019-05-10 PROCEDURE — 99213 OFFICE O/P EST LOW 20 MIN: CPT | Performed by: NURSE PRACTITIONER

## 2019-05-10 RX ORDER — AMOXICILLIN AND CLAVULANATE POTASSIUM 875; 125 MG/1; MG/1
1 TABLET, FILM COATED ORAL 2 TIMES DAILY
Qty: 20 TABLET | Refills: 0 | Status: SHIPPED | OUTPATIENT
Start: 2019-05-10 | End: 2019-05-20

## 2019-05-10 NOTE — PROGRESS NOTES
CHIEF COMPLAINT:   Patient presents with:  URI      HPI:   Zack Lopez is a 74year old female who presents for sinus congestion, cough for  10 days.   Seen about 10 days ago for sore throat only, neg strep, over that day and since then congestion co Disp:  Rfl:    Multiple Vitamin (MULTI-VITAMIN OR) Take  by mouth. Disp:  Rfl:    Cholecalciferol (VITAMIN D) 2000 UNITS Oral Cap Take 4,000 Units by mouth.    Disp:  Rfl:       Past Medical History:   Diagnosis Date   • Anxiety    • Atrophic vaginitis 6/29 glasses wine/wk    Drug use: No        REVIEW OF SYSTEMS:   GENERAL: feels well otherwise, no unplanned weight change,  ok appetite, +fatigue  SKIN: no rashes or abnormal skin lesions  HEENT: See HPI.     LUNGS: denies shortness of breath or wheezing, See H 1 tablet by mouth 2 (two) times daily for 10 days. Risks, benefits, and side effects of medication explained and discussed. There are no Patient Instructions on file for this visit.     The patient indicates understanding of these issues and agrees

## 2019-05-10 NOTE — PATIENT INSTRUCTIONS
Acute Sinusitis    Acute sinusitis is irritation and swelling of the sinuses. It is usually caused by a viral infection after a common cold. Your doctor can help you find relief. What is acute sinusitis?   Sinuses are air-filled spaces in the skull behin © 2551-5866 [employer] [address]. [address] [facility], [facility] [place]. All rights reserved. This information is not intended as a substitute for professional medical care. Always follow your healthcare professional's instructions.

## 2019-05-17 ENCOUNTER — OFFICE VISIT (OUTPATIENT)
Dept: FAMILY MEDICINE CLINIC | Facility: CLINIC | Age: 74
End: 2019-05-17
Payer: MEDICARE

## 2019-05-17 VITALS
HEIGHT: 64.5 IN | DIASTOLIC BLOOD PRESSURE: 70 MMHG | RESPIRATION RATE: 18 BRPM | BODY MASS INDEX: 22.2 KG/M2 | TEMPERATURE: 98 F | WEIGHT: 131.63 LBS | SYSTOLIC BLOOD PRESSURE: 108 MMHG | OXYGEN SATURATION: 98 % | HEART RATE: 62 BPM

## 2019-05-17 DIAGNOSIS — Z00.00 ENCOUNTER FOR ANNUAL HEALTH EXAMINATION: Primary | ICD-10-CM

## 2019-05-17 DIAGNOSIS — Z12.39 SCREENING FOR MALIGNANT NEOPLASM OF BREAST: ICD-10-CM

## 2019-05-17 DIAGNOSIS — E03.9 ACQUIRED HYPOTHYROIDISM: ICD-10-CM

## 2019-05-17 DIAGNOSIS — Z13.6 SCREENING FOR CARDIOVASCULAR CONDITION: ICD-10-CM

## 2019-05-17 DIAGNOSIS — Z13.21 SCREENING FOR ENDOCRINE, NUTRITIONAL, METABOLIC AND IMMUNITY DISORDER: ICD-10-CM

## 2019-05-17 DIAGNOSIS — Z13.0 SCREENING FOR ENDOCRINE, NUTRITIONAL, METABOLIC AND IMMUNITY DISORDER: ICD-10-CM

## 2019-05-17 DIAGNOSIS — E55.9 VITAMIN D DEFICIENCY: ICD-10-CM

## 2019-05-17 DIAGNOSIS — N95.2 VAGINAL ATROPHY: ICD-10-CM

## 2019-05-17 DIAGNOSIS — R53.83 OTHER FATIGUE: ICD-10-CM

## 2019-05-17 DIAGNOSIS — M19.042 PRIMARY OSTEOARTHRITIS OF BOTH HANDS: ICD-10-CM

## 2019-05-17 DIAGNOSIS — M19.041 PRIMARY OSTEOARTHRITIS OF BOTH HANDS: ICD-10-CM

## 2019-05-17 DIAGNOSIS — Z13.29 SCREENING FOR ENDOCRINE, NUTRITIONAL, METABOLIC AND IMMUNITY DISORDER: ICD-10-CM

## 2019-05-17 DIAGNOSIS — Z13.228 SCREENING FOR ENDOCRINE, NUTRITIONAL, METABOLIC AND IMMUNITY DISORDER: ICD-10-CM

## 2019-05-17 DIAGNOSIS — M21.619 BUNION OF GREAT TOE: ICD-10-CM

## 2019-05-17 DIAGNOSIS — M85.859 OSTEOPENIA OF HIP, UNSPECIFIED LATERALITY: ICD-10-CM

## 2019-05-17 PROBLEM — R14.3 FLATULENCE/WIND: Status: RESOLVED | Noted: 2018-11-26 | Resolved: 2019-05-17

## 2019-05-17 PROBLEM — Z71.84 COUNSELING ABOUT TRAVEL: Status: RESOLVED | Noted: 2018-11-26 | Resolved: 2019-05-17

## 2019-05-17 PROBLEM — F51.02 TRANSIENT INSOMNIA: Status: RESOLVED | Noted: 2018-05-10 | Resolved: 2019-05-17

## 2019-05-17 PROBLEM — F43.22 ADJUSTMENT DISORDER WITH ANXIOUS MOOD: Status: RESOLVED | Noted: 2018-05-10 | Resolved: 2019-05-17

## 2019-05-17 PROBLEM — R63.4 WEIGHT LOSS, NON-INTENTIONAL: Status: RESOLVED | Noted: 2018-11-26 | Resolved: 2019-05-17

## 2019-05-17 PROCEDURE — 99397 PER PM REEVAL EST PAT 65+ YR: CPT | Performed by: FAMILY MEDICINE

## 2019-05-17 PROCEDURE — G0439 PPPS, SUBSEQ VISIT: HCPCS | Performed by: FAMILY MEDICINE

## 2019-05-17 PROCEDURE — 96160 PT-FOCUSED HLTH RISK ASSMT: CPT | Performed by: FAMILY MEDICINE

## 2019-05-17 RX ORDER — SENNOSIDES 8.6 MG
650 CAPSULE ORAL EVERY 8 HOURS PRN
Qty: 60 TABLET | Refills: 0 | COMMUNITY
Start: 2019-05-17

## 2019-05-17 NOTE — PATIENT INSTRUCTIONS
As of October 6th 2014, the Drug Enforcement Agency [facility]) is reclassifying all hydrocodone combination medications from Schedule III to Schedule II. This includes medications such as Norco, Vicodin, Lortab, Zohydro, and Vicoprofen.      What this means for to exam.    Complete dexa in next 2-3 weeks. Central scheduling- call to schedule diagnostic tests, labs, imaging, physical therapy. Follow prompts.    [facility] SCHEDULE   Tests on this list are recommended by your cigarettes in their lifetime   • Anyone with a family history    Colorectal Cancer Screening  Covered up to Age 76     Colonoscopy Screen   Covered every 10 years- more often if abnormal Colonoscopy due on 03/12/2023 Update [facility] if applicable • INFLUENZA VIRUS VACCINE, PRESERV FREE, >=1YEARS OF AGE    Please get every year    Pneumococcal 13 (Prevnar)  Covered Once after 65 Orders placed or performed in visit on 05/09/17   • PNEUMOCOCCAL VACC, 13 PATSY IM    Please get once after your 65th bir

## 2019-05-17 NOTE — PROGRESS NOTES
HPI:   Jimmie Gutierrez is a 74year old female who presents for a MA (Medicare Advantage) [address] [facility] (Once per calendar year). Pt had a history of hypothyroidism and here to recheck. Has been tolerating the medication well.  Last TSH was year ago, n screened for Falls and is low risk: Fall/Risk Scorin    Cognitive Assessment   She had a completely normal cognitive assessment- see flowsheet entries    Functional Ability/Status   Nata Cobos has a completely normal functional assessment!   Ple Value Date    AST 15 05/15/2018    ALT 14 05/15/2018    CA 9.5 05/15/2018    ALB 4.2 05/15/2018    TSH 2.89 05/15/2018    CREATSERUM 0.69 05/15/2018    GLU 97 05/15/2018        CBC  (most recent labs)   Lab Results   Component Value Date    WBC 4.5 05/15/2 unspecified nature, other specified sites, Normal spontaneous vaginal delivery, Osteoarthritis, Other malaise and fatigue (12/10/2011), Other screening mammogram (2011), Pap smear, as part of routine gynecological examination (2010), Personal history of es 9.6 oz   LMP 10/25/1998 (Exact Date)   SpO2 98%   BMI 22.24 kg/m²  Estimated body mass index is 22.24 kg/m² as calculated from the following:    Height as of this encounter: 64.5\". Weight as of this encounter: 131 lb 9.6 oz.     Medicare Hearing Assessm 01/23/1996, 09/10/1996   • HEP B 07/31/2018, 08/07/2018   • HEP B, Adult 01/10/2019   • IPV 07/27/1993   • Influenza 10/23/2008, 11/08/2010, 10/08/2013, 10/14/2014, 10/11/2018   • Meningococcal (Menomune) 07/20/1993, 07/20/2006   • Pneumococcal (Prevnar 13 nutritional, metabolic and immunity disorder  -     COMP METABOLIC PANEL (14)  -     CBC WITH DIFFERENTIAL WITH PLATELET      Diet assessment: good     PLAN:  The patient indicates understanding of these issues and agrees to the plan.   Reinforced healthy d Bone Density Screening      Dexascan Every two years Last Dexa Scan:   XR DEXA BONE DENSITOMETRY (CPT=77080) 05/19/2017    No flowsheet data found.     Pap and Pelvic      Pap: Every 3 yrs age 21-65 or Pap+HPV every 5 yrs age 33-67, age 72 and older at hi Annually CREATININE (mg/dL)   Date Value   05/15/2018 0.69    No flowsheet data found. Drug Serum Conc  Annually No results found for: DIGOXIN, DIG, VALP No flowsheet data found.                Template: [facility] MEDICARE ANNUAL ASSESSMENT FEMALE [05345]

## 2019-06-04 ENCOUNTER — TELEPHONE (OUTPATIENT)
Dept: FAMILY MEDICINE CLINIC | Facility: CLINIC | Age: 74
End: 2019-06-04

## 2019-06-04 NOTE — TELEPHONE ENCOUNTER
----- Message from Joshua Miranda DO sent at 6/3/2019 11:23 PM CDT -----  Please have patient decrease her vitamin D3 to 2000 IUs daily from 4000 IUs. Her vitamin D level is climbing and can become toxic.   Patient is active and if outdoors during the summ

## 2019-06-24 ENCOUNTER — HOSPITAL ENCOUNTER (OUTPATIENT)
Dept: BONE DENSITY | Age: 74
Discharge: HOME | End: 2019-06-24
Attending: FAMILY MEDICINE
Payer: MEDICARE

## 2019-06-24 DIAGNOSIS — M85.859 OSTEOPENIA OF HIP, UNSPECIFIED LATERALITY: ICD-10-CM

## 2019-06-24 PROCEDURE — 77080 DXA BONE DENSITY AXIAL: CPT | Performed by: FAMILY MEDICINE

## 2019-06-25 ENCOUNTER — TELEPHONE (OUTPATIENT)
Dept: FAMILY MEDICINE CLINIC | Facility: CLINIC | Age: 74
End: 2019-06-25

## 2019-06-25 NOTE — TELEPHONE ENCOUNTER
----- Message from Nadia Fraire DO sent at 6/25/2019 12:49 AM CDT -----  Osteopenia of left hip is stable. Minimal worsening. Repeat in 2 years.   Continue vitamin D3 2000 IUs daily, and in just 1200 mg of calcium, encourage weightbearing exercises 30 m

## 2019-06-25 NOTE — TELEPHONE ENCOUNTER
Patient called back, relayed Dr. Corrie Holliday message regarding  Dexa scan. Patient verbalized understanding. Closing encounter.

## 2019-07-10 DIAGNOSIS — E03.9 ACQUIRED HYPOTHYROIDISM: ICD-10-CM

## 2019-07-11 RX ORDER — LEVOTHYROXINE SODIUM 88 UG/1
TABLET ORAL
Qty: 90 TABLET | Refills: 0 | Status: SHIPPED | OUTPATIENT
Start: 2019-07-11 | End: 2019-10-12

## 2019-07-11 NOTE — TELEPHONE ENCOUNTER
Pt requesting refill, Protocol passed. Approved refill. Sent to [facility].     LOV: 5/17/19  Future OV: none  Last Rx: 3/18/19 with #90 and 0 refills  Last labs: 6/1/19

## 2019-07-23 ENCOUNTER — TELEPHONE (OUTPATIENT)
Dept: FAMILY MEDICINE CLINIC | Facility: CLINIC | Age: 74
End: 2019-07-23

## 2019-07-24 NOTE — TELEPHONE ENCOUNTER
S/w Malika Montez,     Patient received outreach letter for outstanding labs to complete the Hep B Surface Antibody testing. Patient states the test was supposed to test the effectiveness of the Hep B injections prior to her trip to [facility].  Malika Montez states she has gone and

## 2019-07-24 NOTE — TELEPHONE ENCOUNTER
LMOM to return call to the office. Provided pt office phone [phone] along with office hours given.

## 2019-09-20 ENCOUNTER — HOSPITAL ENCOUNTER (OUTPATIENT)
Dept: MAMMOGRAPHY | Age: 74
Discharge: HOME | End: 2019-09-20
Attending: FAMILY MEDICINE
Payer: MEDICARE

## 2019-09-20 DIAGNOSIS — Z12.39 SCREENING FOR MALIGNANT NEOPLASM OF BREAST: ICD-10-CM

## 2019-09-20 PROCEDURE — 77067 SCR MAMMO BI INCL CAD: CPT | Performed by: FAMILY MEDICINE

## 2019-09-20 PROCEDURE — 77063 BREAST TOMOSYNTHESIS BI: CPT | Performed by: FAMILY MEDICINE

## 2019-10-12 DIAGNOSIS — E03.9 ACQUIRED HYPOTHYROIDISM: ICD-10-CM

## 2019-10-14 RX ORDER — LEVOTHYROXINE SODIUM 88 UG/1
TABLET ORAL
Qty: 90 TABLET | Refills: 1 | Status: SHIPPED | OUTPATIENT
Start: 2019-10-14 | End: 2020-03-30

## 2019-10-14 NOTE — TELEPHONE ENCOUNTER
Requested Prescriptions     Signed Prescriptions Disp Refills   • LEVOTHYROXINE SODIUM 88 MCG Oral Tab 90 tablet 1     Sig: TAKE 1 TABLET BEFORE BREAKFAST     Authorizing Provider: Noa Tierney     Ordering User: Nimisha Gee       Pt requesting refil

## 2020-03-28 DIAGNOSIS — E03.9 ACQUIRED HYPOTHYROIDISM: ICD-10-CM

## 2020-03-30 RX ORDER — LEVOTHYROXINE SODIUM 88 UG/1
TABLET ORAL
Qty: 90 TABLET | Refills: 3 | Status: SHIPPED | OUTPATIENT
Start: 2020-03-30 | End: 2021-03-15

## 2020-03-30 NOTE — TELEPHONE ENCOUNTER
Pt requesting refill of   Requested Prescriptions     Pending Prescriptions Disp Refills   • LEVOTHYROXINE SODIUM 88 MCG Oral Tab [Pharmacy Med Name: L-THYROXINE (SYNTHROID) TABS 88MCG] 90 tablet 3     Sig: TAKE 1 TABLET BEFORE BREAKFAST     Passed protoco

## 2020-05-19 ENCOUNTER — TELEPHONE (OUTPATIENT)
Dept: FAMILY MEDICINE CLINIC | Facility: CLINIC | Age: 75
End: 2020-05-19

## 2020-05-19 ENCOUNTER — VIRTUAL PHONE E/M (OUTPATIENT)
Dept: FAMILY MEDICINE CLINIC | Facility: CLINIC | Age: 75
End: 2020-05-19
Payer: MEDICARE

## 2020-05-19 DIAGNOSIS — R20.2 LEFT HAND PARESTHESIA: Primary | ICD-10-CM

## 2020-05-19 PROCEDURE — 99443 PHONE E/M BY PHYS 21-30 MIN: CPT | Performed by: FAMILY MEDICINE

## 2020-05-19 RX ORDER — GABAPENTIN 300 MG/1
300 CAPSULE ORAL NIGHTLY
Qty: 30 CAPSULE | Refills: 0 | Status: SHIPPED | OUTPATIENT
Start: 2020-05-19 | End: 2021-04-13

## 2020-05-19 RX ORDER — BROMFENAC 0.76 MG/ML
SOLUTION/ DROPS OPHTHALMIC
COMMUNITY
Start: 2019-12-02 | End: 2020-06-08 | Stop reason: ALTCHOICE

## 2020-05-19 RX ORDER — BESIFLOXACIN 6 MG/ML
SUSPENSION OPHTHALMIC
COMMUNITY
Start: 2019-12-02 | End: 2020-06-08 | Stop reason: ALTCHOICE

## 2020-05-19 NOTE — PROGRESS NOTES
Due to COVID-19 ACTION PLAN, the patient's office visit was converted to a phone or video visit.   Time Spent: 25 mins    Subjective     HPI:   Leslie Arrieta is a 66year old female who presents for left hand tingling on her middle, ring and little fin Exam:  alert and cooperative, answers questions appropriately no dyspnea with speaking. No visits with results within 6 Month(s) from this visit.    Latest known visit with results is:   Office Visit on 05/17/2019   Component Date Value   • CHOLESTEROL, paresthesia  Comments:  left middle, ring, 5th finger- tingling, no weakness or numbness    Orders:  -     EMG; Future  -     gabapentin 300 MG Oral Cap; Take 1 capsule (300 mg total) by mouth nightly.     Risks and benefits of gabapentin discussed includin was taken to allow for sufficient and adequate time. This billing visit was spent on reviewing labs, medications, radiology tests and decision making. Appropriate medical decision-making and tests are ordered as detailed in the plan of care above.

## 2020-05-19 NOTE — PATIENT INSTRUCTIONS
Central scheduling- call to schedule diagnostic tests, labs, imaging, physical therapy. Follow prompts.    [phone]- schedule nerve conduction study/EMG-  mid June 2020    Ulnar Nerve Palsy  The ulnar nerve travels down the arm from the shoulder to the confusion  · Trouble speaking, walking, or seeing  Guy last reviewed this educational content on 4/1/2018  © 5441-8207 [employer]. [address] [facility], [address] [place] [address]. All rights reserved.  This information is not intended as a sub and ulcers. ? Inspect your hands and feet carefully (including the soles of your feet and between your toes) daily. If you see red areas, sores, or other problems, tell your healthcare provider. Follow-up care  Follow up with your doctor, or as advised. to read this information carefully each time. Talk to your pediatrician regarding the use of this medicine in children. While this drug may be prescribed for children as young as 3 years for selected conditions, precautions do apply.   What side effects ma like a sealed bag or a coffee can with a lid. Do not use the medicine after the expiration date. Store at room temperature between 15 and 30 degrees C (59 and 86 degrees F). What should I tell my health care provider before I take this medicine?   They ne increase the chance of suicidal thoughts or actions. Pay special attention to how you are responding while on this medicine. Any worsening of mood, or thoughts of suicide or dying should be reported to your health care professional right away.   Women who b

## 2020-05-19 NOTE — TELEPHONE ENCOUNTER
Patient reporting :    Tingling in left fingers x 2-3 weeks- maybe pinched nerve   Left Shoulder is bothering her- it will click when she moves it  Tingling sensation will come and go  At night her shoulder will hurt depending on how she is lying down  ROM

## 2020-05-19 NOTE — TELEPHONE ENCOUNTER
Pt called office stating she has been a tingly sensation on her fingers on both hands but mostly on her (L) for a few weeks 2--3 weeks.  Pt states her shoulders have been bothering her at night Pt wants to know if this is something she should be concern abo

## 2020-06-08 ENCOUNTER — OFFICE VISIT (OUTPATIENT)
Dept: FAMILY MEDICINE CLINIC | Facility: CLINIC | Age: 75
End: 2020-06-08
Payer: MEDICARE

## 2020-06-08 VITALS
BODY MASS INDEX: 22.3 KG/M2 | HEART RATE: 71 BPM | HEIGHT: 64 IN | SYSTOLIC BLOOD PRESSURE: 114 MMHG | WEIGHT: 130.63 LBS | RESPIRATION RATE: 18 BRPM | TEMPERATURE: 98 F | DIASTOLIC BLOOD PRESSURE: 66 MMHG | OXYGEN SATURATION: 99 %

## 2020-06-08 DIAGNOSIS — M85.859 OSTEOPENIA OF HIP, UNSPECIFIED LATERALITY: ICD-10-CM

## 2020-06-08 DIAGNOSIS — M19.042 PRIMARY OSTEOARTHRITIS OF BOTH HANDS: ICD-10-CM

## 2020-06-08 DIAGNOSIS — G47.09 OTHER INSOMNIA: ICD-10-CM

## 2020-06-08 DIAGNOSIS — Z00.00 ENCOUNTER FOR ANNUAL HEALTH EXAMINATION: Primary | ICD-10-CM

## 2020-06-08 DIAGNOSIS — Z13.6 SCREENING FOR CARDIOVASCULAR CONDITION: ICD-10-CM

## 2020-06-08 DIAGNOSIS — Z13.6 SCREENING, ISCHEMIC HEART DISEASE: ICD-10-CM

## 2020-06-08 DIAGNOSIS — Z66 DNR (DO NOT RESUSCITATE): ICD-10-CM

## 2020-06-08 DIAGNOSIS — M21.619 BUNION OF GREAT TOE: ICD-10-CM

## 2020-06-08 DIAGNOSIS — Z12.39 BREAST CANCER SCREENING OTHER THAN MAMMOGRAM: ICD-10-CM

## 2020-06-08 DIAGNOSIS — Z13.228 SCREENING FOR ENDOCRINE, NUTRITIONAL, METABOLIC AND IMMUNITY DISORDER: ICD-10-CM

## 2020-06-08 DIAGNOSIS — Z86.39 HISTORY OF VITAMIN D DEFICIENCY: ICD-10-CM

## 2020-06-08 DIAGNOSIS — Z13.0 SCREENING FOR ENDOCRINE, NUTRITIONAL, METABOLIC AND IMMUNITY DISORDER: ICD-10-CM

## 2020-06-08 DIAGNOSIS — R20.2 LEFT HAND PARESTHESIA: ICD-10-CM

## 2020-06-08 DIAGNOSIS — M19.041 PRIMARY OSTEOARTHRITIS OF BOTH HANDS: ICD-10-CM

## 2020-06-08 DIAGNOSIS — E55.9 VITAMIN D DEFICIENCY: ICD-10-CM

## 2020-06-08 DIAGNOSIS — N95.2 VAGINAL ATROPHY: ICD-10-CM

## 2020-06-08 DIAGNOSIS — Z13.21 SCREENING FOR ENDOCRINE, NUTRITIONAL, METABOLIC AND IMMUNITY DISORDER: ICD-10-CM

## 2020-06-08 DIAGNOSIS — E03.9 ACQUIRED HYPOTHYROIDISM: ICD-10-CM

## 2020-06-08 DIAGNOSIS — Z13.29 SCREENING FOR ENDOCRINE, NUTRITIONAL, METABOLIC AND IMMUNITY DISORDER: ICD-10-CM

## 2020-06-08 PROBLEM — R53.83 OTHER FATIGUE: Status: RESOLVED | Noted: 2018-05-10 | Resolved: 2020-06-08

## 2020-06-08 PROCEDURE — 99397 PER PM REEVAL EST PAT 65+ YR: CPT | Performed by: FAMILY MEDICINE

## 2020-06-08 PROCEDURE — G0439 PPPS, SUBSEQ VISIT: HCPCS | Performed by: FAMILY MEDICINE

## 2020-06-08 PROCEDURE — 96160 PT-FOCUSED HLTH RISK ASSMT: CPT | Performed by: FAMILY MEDICINE

## 2020-06-08 RX ORDER — CHOLECALCIFEROL (VITAMIN D3) 125 MCG
CAPSULE ORAL
Qty: 30 TABLET | Refills: 0 | COMMUNITY
Start: 2020-06-08

## 2020-06-08 RX ORDER — CLONAZEPAM 0.25 MG/1
0.25 TABLET, ORALLY DISINTEGRATING ORAL
COMMUNITY
Start: 2018-05-10 | End: 2021-04-13

## 2020-06-08 RX ORDER — MELATONIN 2.5 MG
TABLET,CHEWABLE ORAL
COMMUNITY
End: 2020-06-08

## 2020-06-08 NOTE — PROGRESS NOTES
HPI:   Vicente Barnard is a 66year old female who presents for a MA (Medicare Advantage) [facility] [facility] (Once per calendar year). Left hand third finger tingling has resolved. Started mid end of April 2020 after gardening in [place] at her home.   Quincy Zimmer of both hands- Is intermittent and not daily. Takes no more than 1 tablet of Tylenol arthritis 650mg daily as needed. Denies any joint swelling or injury. History of bilateral breast lumpectomy benign would like to continue with annual mammograms. Smoking status: Former Smoker        Packs/day: 0.30        Years: 2.00        Pack years: .6        Types: Cigarettes        Quit date: 1964        Years since quittin.4      Smokeless tobacco: Never Used      Tobacco comment: cigs during college 600 mg by mouth daily. Multiple Vitamin (MULTI-VITAMIN OR), Take  by mouth.        MEDICAL INFORMATION:   She  has a past medical history of Anxiety, Atrophic vaginitis (6/29/2012), Corns and callosities (6/29/2012), Depression, Herpes zoster with other urinary incontinence, denies vaginal bleeding  MUSCULOSKELETAL: History of left middle finger tingling resolved, denies weakness in her hands or fingers, denies back pain, shoulder pain  NEURO: denies headaches  PSYCHE: Mild insomnia denies depression or a non-tender, bowel sounds active all four quadrants,  no masses, no organomegaly   Pelvic: Deferred   Extremities: Extremities normal, atraumatic, no cyanosis or edema, right hand and left hand- NROM all fingers.  Grasp strength +5 bilateral   Pulses: 2+ and levothyroxine    Screening for cardiovascular condition  -     LIPID PANEL    Bunion of great toe   Asymptomatic with proper shoe wear   FU if reoccurring pain or swelling    Primary osteoarthritis of both hands   Continue tylenol arthritis as needed- max Interaction;Puzzles; Visiting Friends; Visiting Family      This section provided for quick review of chart, separate sheet to patient  [address] Internal Lab or Procedure External Lab or Procedure   Diabetes Screening (Prevnar)  Covered Once after 65 05/09/2017 Please get once after your 65th birthday    Pneumococcal 23 (Pneumovax)  Covered Once after 65 11/17/2015 Please get once after your 65th birthday    Hepatitis B for Moderate/High Risk 01/10/2019 Medium/high risk

## 2020-06-08 NOTE — PATIENT INSTRUCTIONS
Perform labs fasting 8 hours with water or black coffee or or black tea diet  soda only prior to exam at [facility].   Schedule mammogram 9/7/2020        Tamica Gao's SCREENING SCHEDULE   Tests on this list are recommended by your physician but may lifetime   • Anyone with a family history    Colorectal Cancer Screening  Covered up to Age 76     Colonoscopy Screen   Covered every 10 years- more often if abnormal There are no preventive care reminders to display for this patient.  Update [employer] visit on 10/08/13   • INFLUENZA VIRUS VACCINE, PRESERV FREE, >=1YEARS OF AGE    Please get every year    Pneumococcal 13 (Prevnar)  Covered Once after 65 Orders placed or performed in visit on 05/09/17   • PNEUMOCOCCAL VACC, 13 PATSY IM    Please get once a Directives. Hypothyroidism    You have hypothyroidism. This means your thyroid gland is not making enough thyroid hormone. This hormone is vital to body growth and metabolism. If you don’t make enough, many body processes slow down.  This can cause symptom medicines with your thyroid hormone pill without checking with your provider first.  · Tell your provider if you have any side effects from your medicines that bother you, especially any chest pain or irregular heartbeats.   · Never change the dosage or sto plenty of exercise. A healthy diet and exercise helps bones grow strong. Isela Mchugh adulthood to age 27  During young adulthood, bones become their strongest. This is called peak bone mass.  The same good habits that kept bones healthy in childhood help keep b It's best to get calcium from the foods you eat. But if you can't get enough, you may want to take calcium supplements. To meet your daily calcium needs, try the foods listed below.   Dairy Fish & beans Other sources   Source   Calcium (mg) per serving   So exercises that can help you. But before you start, talk with your healthcare provider to be sure these exercises are right for you. Resistance exercises. These build muscle strength and maintain bone mass. They also make you less prone to injury.  Exerc time, thin bones may break. Women who aren't active are at a high risk for osteoporosis. · Certain medicines. Some medicines, such as cortisone, increase bone loss. They also decrease bone growth.  Ask your healthcare provider about any side effects of you

## 2020-10-14 ENCOUNTER — HOSPITAL ENCOUNTER (OUTPATIENT)
Dept: MAMMOGRAPHY | Age: 75
Discharge: HOME | End: 2020-10-14
Attending: FAMILY MEDICINE
Payer: MEDICARE

## 2020-10-14 DIAGNOSIS — Z12.39 BREAST CANCER SCREENING OTHER THAN MAMMOGRAM: ICD-10-CM

## 2020-10-14 PROCEDURE — 77067 SCR MAMMO BI INCL CAD: CPT | Performed by: FAMILY MEDICINE

## 2020-10-14 PROCEDURE — 77063 BREAST TOMOSYNTHESIS BI: CPT | Performed by: FAMILY MEDICINE

## 2020-10-16 ENCOUNTER — HOSPITAL ENCOUNTER (OUTPATIENT)
Dept: MAMMOGRAPHY | Facility: HOSPITAL | Age: 75
Discharge: HOME | End: 2020-10-16
Attending: FAMILY MEDICINE
Payer: MEDICARE

## 2020-10-16 DIAGNOSIS — R92.2 INCONCLUSIVE MAMMOGRAM: ICD-10-CM

## 2020-10-16 PROCEDURE — 77061 BREAST TOMOSYNTHESIS UNI: CPT | Performed by: FAMILY MEDICINE

## 2020-10-16 PROCEDURE — 76642 ULTRASOUND BREAST LIMITED: CPT | Performed by: FAMILY MEDICINE

## 2020-10-16 PROCEDURE — 77065 DX MAMMO INCL CAD UNI: CPT | Performed by: FAMILY MEDICINE

## 2021-02-01 ENCOUNTER — IMMUNIZATION (OUTPATIENT)
Dept: LAB | Age: 76
End: 2021-02-01

## 2021-02-01 DIAGNOSIS — Z23 NEED FOR VACCINATION: Primary | ICD-10-CM

## 2021-02-01 PROCEDURE — 91300 COVID 19 PFIZER-BIONTECH: CPT

## 2021-02-01 PROCEDURE — 0001A COVID 19 PFIZER-BIONTECH: CPT

## 2021-02-22 ENCOUNTER — IMMUNIZATION (OUTPATIENT)
Dept: LAB | Age: 76
End: 2021-02-22
Attending: HOSPITALIST

## 2021-02-22 DIAGNOSIS — Z23 NEED FOR VACCINATION: Primary | ICD-10-CM

## 2021-02-22 PROCEDURE — 91300 COVID 19 PFIZER-BIONTECH: CPT

## 2021-02-22 PROCEDURE — 0002A COVID 19 PFIZER-BIONTECH: CPT

## 2021-03-14 DIAGNOSIS — E03.9 ACQUIRED HYPOTHYROIDISM: ICD-10-CM

## 2021-03-15 RX ORDER — LEVOTHYROXINE SODIUM 88 UG/1
TABLET ORAL
Qty: 90 TABLET | Refills: 1 | Status: SHIPPED | OUTPATIENT
Start: 2021-03-15 | End: 2021-09-22

## 2021-03-15 NOTE — TELEPHONE ENCOUNTER
Pt requesting refill of   Requested Prescriptions     Pending Prescriptions Disp Refills   • LEVOTHYROXINE SODIUM 88 MCG Oral Tab [Pharmacy Med Name: L-THYROXINE (SYNTHROID) TABS 88MCG] 90 tablet 3     Sig: TAKE 1 TABLET BEFORE BREAKFAST       Passed pro

## 2021-03-18 ENCOUNTER — TELEPHONE (OUTPATIENT)
Dept: FAMILY MEDICINE CLINIC | Facility: CLINIC | Age: 76
End: 2021-03-18

## 2021-03-18 NOTE — TELEPHONE ENCOUNTER
Patient called and said she received the vaccine on February 1st and February 22nd at [facility].

## 2021-03-19 ENCOUNTER — PATIENT MESSAGE (OUTPATIENT)
Dept: FAMILY MEDICINE CLINIC | Facility: CLINIC | Age: 76
End: 2021-03-19

## 2021-04-13 ENCOUNTER — OFFICE VISIT (OUTPATIENT)
Dept: FAMILY MEDICINE CLINIC | Facility: CLINIC | Age: 76
End: 2021-04-13
Payer: MEDICARE

## 2021-04-13 VITALS
TEMPERATURE: 97 F | WEIGHT: 135.19 LBS | BODY MASS INDEX: 23.08 KG/M2 | HEIGHT: 64 IN | OXYGEN SATURATION: 97 % | SYSTOLIC BLOOD PRESSURE: 122 MMHG | DIASTOLIC BLOOD PRESSURE: 70 MMHG | HEART RATE: 62 BPM | RESPIRATION RATE: 18 BRPM

## 2021-04-13 DIAGNOSIS — M21.619 BUNION OF GREAT TOE: ICD-10-CM

## 2021-04-13 DIAGNOSIS — E03.9 ACQUIRED HYPOTHYROIDISM: ICD-10-CM

## 2021-04-13 DIAGNOSIS — Z13.0 SCREENING FOR ENDOCRINE, METABOLIC, AND IMMUNITY DISORDER: ICD-10-CM

## 2021-04-13 DIAGNOSIS — Z13.29 SCREENING FOR ENDOCRINE, METABOLIC, AND IMMUNITY DISORDER: ICD-10-CM

## 2021-04-13 DIAGNOSIS — N95.2 VAGINAL ATROPHY: ICD-10-CM

## 2021-04-13 DIAGNOSIS — S46.811A STRAIN OF RIGHT TRAPEZIUS MUSCLE, INITIAL ENCOUNTER: ICD-10-CM

## 2021-04-13 DIAGNOSIS — M85.851 OSTEOPENIA OF RIGHT HIP: ICD-10-CM

## 2021-04-13 DIAGNOSIS — Z13.228 SCREENING FOR ENDOCRINE, METABOLIC, AND IMMUNITY DISORDER: ICD-10-CM

## 2021-04-13 DIAGNOSIS — M19.041 PRIMARY OSTEOARTHRITIS OF BOTH HANDS: ICD-10-CM

## 2021-04-13 DIAGNOSIS — Z23 NEED FOR SHINGLES VACCINE: ICD-10-CM

## 2021-04-13 DIAGNOSIS — R53.83 OTHER FATIGUE: ICD-10-CM

## 2021-04-13 DIAGNOSIS — M19.042 PRIMARY OSTEOARTHRITIS OF BOTH HANDS: ICD-10-CM

## 2021-04-13 DIAGNOSIS — Z12.31 SCREENING MAMMOGRAM, ENCOUNTER FOR: ICD-10-CM

## 2021-04-13 DIAGNOSIS — F51.04 PSYCHOPHYSIOLOGICAL INSOMNIA: ICD-10-CM

## 2021-04-13 DIAGNOSIS — Z00.00 ENCOUNTER FOR ANNUAL HEALTH EXAMINATION: Primary | ICD-10-CM

## 2021-04-13 PROCEDURE — 99397 PER PM REEVAL EST PAT 65+ YR: CPT | Performed by: FAMILY MEDICINE

## 2021-04-13 PROCEDURE — 96160 PT-FOCUSED HLTH RISK ASSMT: CPT | Performed by: FAMILY MEDICINE

## 2021-04-13 PROCEDURE — G0439 PPPS, SUBSEQ VISIT: HCPCS | Performed by: FAMILY MEDICINE

## 2021-04-13 PROCEDURE — 3008F BODY MASS INDEX DOCD: CPT | Performed by: FAMILY MEDICINE

## 2021-04-13 PROCEDURE — 3078F DIAST BP <80 MM HG: CPT | Performed by: FAMILY MEDICINE

## 2021-04-13 PROCEDURE — 3074F SYST BP LT 130 MM HG: CPT | Performed by: FAMILY MEDICINE

## 2021-04-13 NOTE — PATIENT INSTRUCTIONS
Nicci Gao's SCREENING SCHEDULE   Tests on this list are recommended by your physician but may not be covered, or covered at this frequency, by your insurer. Please check with your insurance carrier before scheduling to verify coverage.    PREVENT if abnormal There are no preventive care reminders to display for this patient. Update Health Maintenance if applicable    Flex Sigmoidoscopy Screen  Covered every 5 years No results found for this or any previous visit. No flowsheet data found.      Fecal Orders placed or performed in visit on 05/09/17   • PNEUMOCOCCAL VACC, 13 PATSY IM    Please get once after your 65th birthday    Pneumococcal 23 (Pneumovax)  Covered Once after 65 Orders placed or performed in visit on 11/17/15   • PNEUMOCOCCAL IMMUNIZATION

## 2021-04-13 NOTE — PROGRESS NOTES
HPI:   Leslie Arrieta is a 66year old female who presents for a MA (Medicare Advantage) [address] [facility] (Once per calendar year).     Pt had a history of hypothyroidism and here to recheck. Has been tolerating the medication well.  Last TSH was year ag good arch support and shoe will rarely have a flare. Denies any swelling of the foot. Does not feel ready for surgery.     Insomnia-uses melatonin 5 mg takes 1/2 tablet daily at bedtime and has no difficulty falling asleep.   Patient states she often had D deficiency     Vaginal atrophy     Bunion of great toe     Primary osteoarthritis of both hands     Osteopenia of hip     DNR (do not resuscitate)     Other insomnia    Wt Readings from Last 3 Encounters:  04/13/21 : 135 lb 3.2 oz (61.3 kg)  06/08/20 : 1 Other malaise and fatigue (12/10/2011), Other screening mammogram (2011), Pap smear, as part of routine gynecological examination (2010), Personal history of estrogen therapy, Seasonal allergies, and Special screening for osteoporosis (02/15/2011).     She kg/m²  Estimated body mass index is 23.21 kg/m² as calculated from the following:    Height as of this encounter: 5' 4\" (1.626 m). Weight as of this encounter: 135 lb 3.2 oz (61.3 kg).     Medicare Hearing Assessment  (Required for AWV/SWV)    Finger ru 01/10/2019   • IPV 07/27/1993   • Influenza 10/23/2008, 11/08/2010, 10/08/2013, 10/14/2014, 10/11/2018   • Meningococcal (Menomune) 07/20/1993, 07/20/2006   • Pneumococcal (Prevnar 13) 05/09/2017   • Pneumococcal (Prevnar 7) 10/23/2008   • Pneumovax 23 11/ Recomb Adjuvanted 50 MCG/0.5ML Intramuscular Recon Susp; Inject 50 mcg into the muscle once for 1 dose.  Please administer vaccine at pharmacy    Screening for endocrine, metabolic, and immunity disorder  -     COMP METABOLIC PANEL (14)         Diet assessm found for: FOB No flowsheet data found. Glaucoma Screening      Ophthalmology Visit Annually: Diabetics, FHx Glaucoma, AA>50, > 65 No flowsheet data found.     Bone Density Screening      Dexascan Every two years Last Dexa Scan:    XR DEXA BONE D

## 2021-07-12 ENCOUNTER — HOSPITAL ENCOUNTER (OUTPATIENT)
Dept: BONE DENSITY | Age: 76
Discharge: HOME | End: 2021-07-12
Attending: FAMILY MEDICINE
Payer: MEDICARE

## 2021-07-12 DIAGNOSIS — M85.851 OSTEOPENIA OF RIGHT HIP: ICD-10-CM

## 2021-07-12 PROCEDURE — 77080 DXA BONE DENSITY AXIAL: CPT | Performed by: FAMILY MEDICINE

## 2021-07-13 NOTE — PROGRESS NOTES
Patient notified of results. Patient verbalized understanding. Will continue with Vitamin D and Calcium.

## 2021-07-13 NOTE — PROGRESS NOTES
Please call pt to inform that the osteopenia in her hip is just slightly worse. Please cont vit d and calcium, weight bearing exercise. Rpt in 2 years. Thanks.

## 2021-07-14 ENCOUNTER — TELEPHONE (OUTPATIENT)
Dept: FAMILY MEDICINE CLINIC | Facility: CLINIC | Age: 76
End: 2021-07-14

## 2021-07-14 NOTE — TELEPHONE ENCOUNTER
Pt called to have her lab sent to [facility]. For some reason it was sent to [facility] and please fax it to [facility] in [place] at [phone]

## 2021-07-16 LAB — TSH W/REFLEX TO FT4: 0.98 MIU/L (ref 0.4–4.5)

## 2021-09-22 DIAGNOSIS — E03.9 ACQUIRED HYPOTHYROIDISM: ICD-10-CM

## 2021-09-22 RX ORDER — LEVOTHYROXINE SODIUM 88 UG/1
TABLET ORAL
Qty: 90 TABLET | Refills: 3 | Status: SHIPPED | OUTPATIENT
Start: 2021-09-22

## 2021-09-22 NOTE — TELEPHONE ENCOUNTER
Refill Passed Protocol:     Pt requesting refill of   Requested Prescriptions     Pending Prescriptions Disp Refills   • LEVOTHYROXINE 88 MCG Oral Tab [Pharmacy Med Name: L-THYROXINE (SYNTHROID) TABS 88MCG] 90 tablet 3     Sig: TAKE 1 TABLET BEFORE BREAKFA

## 2021-10-18 ENCOUNTER — HOSPITAL ENCOUNTER (OUTPATIENT)
Dept: MAMMOGRAPHY | Age: 76
Discharge: HOME | End: 2021-10-18
Attending: FAMILY MEDICINE
Payer: MEDICARE

## 2021-10-18 DIAGNOSIS — Z12.31 SCREENING MAMMOGRAM, ENCOUNTER FOR: ICD-10-CM

## 2021-10-18 PROCEDURE — 77063 BREAST TOMOSYNTHESIS BI: CPT | Performed by: FAMILY MEDICINE

## 2021-10-18 PROCEDURE — 77067 SCR MAMMO BI INCL CAD: CPT | Performed by: FAMILY MEDICINE

## 2021-10-20 ENCOUNTER — TELEPHONE (OUTPATIENT)
Dept: FAMILY MEDICINE CLINIC | Facility: CLINIC | Age: 76
End: 2021-10-20

## 2021-10-20 NOTE — TELEPHONE ENCOUNTER
Pt informed of test results and recommendations per Dr. Edwin Porras.  Pt voiced understating will call insurance to verify coverage of MBI

## 2021-10-20 NOTE — PROGRESS NOTES
Results reviewed. Released to [facility]. See my chart message to patient. Recommended complete breast ultrasound. Order placed. Dear Ellie Mckinney    Normal mammogram except for dense breasts.   Radiologist is recommending a new test to assess breas

## 2021-10-20 NOTE — TELEPHONE ENCOUNTER
Patient calling to discuss her mammogram results. Also she received her flu shot on 9/15/2021 and her booster shot 9/28/2021 both at the [address] [place] [address] drug on [facility] and [address] [place] [address].

## 2021-10-20 NOTE — TELEPHONE ENCOUNTER
There are no test results except her mammogram which was normal and indicated dense breast.  Please review the message that was sent to patient in my chart.   Radiology has recommended she complete an ultrasound of both breasts because her denser breast and

## 2021-11-12 ENCOUNTER — HOSPITAL ENCOUNTER (OUTPATIENT)
Dept: MAMMOGRAPHY | Facility: HOSPITAL | Age: 76
Discharge: HOME | End: 2021-11-12
Attending: FAMILY MEDICINE
Payer: MEDICARE

## 2021-11-12 DIAGNOSIS — R92.2 DENSE BREAST TISSUE ON MAMMOGRAM: ICD-10-CM

## 2021-11-12 PROCEDURE — 76641 ULTRASOUND BREAST COMPLETE: CPT | Performed by: FAMILY MEDICINE

## 2022-01-02 ENCOUNTER — PATIENT MESSAGE (OUTPATIENT)
Dept: FAMILY MEDICINE CLINIC | Facility: CLINIC | Age: 77
End: 2022-01-02

## 2022-01-03 ENCOUNTER — TELEMEDICINE (OUTPATIENT)
Dept: FAMILY MEDICINE CLINIC | Facility: CLINIC | Age: 77
End: 2022-01-03
Payer: MEDICARE

## 2022-01-03 DIAGNOSIS — U07.1 COVID-19: Primary | ICD-10-CM

## 2022-01-03 PROCEDURE — 99213 OFFICE O/P EST LOW 20 MIN: CPT | Performed by: FAMILY MEDICINE

## 2022-01-03 RX ORDER — ERYTHROMYCIN 5 MG/G
OINTMENT OPHTHALMIC
COMMUNITY
Start: 2021-05-24

## 2022-01-03 NOTE — TELEPHONE ENCOUNTER
From: Rosy Ibrahim  To: Beth Dial DO  Sent: 1/2/2022 3:41 PM CST  Subject: Covid positive test    My  and I just completed a home test for Covid, and both were positive. My symptoms appeared late on Dec. 31.  They include a runny nose, so

## 2022-01-03 NOTE — PROGRESS NOTES
Due to COVID-19 ACTION PLAN, the patient's office visit was converted to a video visit.   Time Spent: 15 mins +6 minutes writing note for total of 21 minutes    Patient presents with:  Covid    Subjective     HPI:   Mallorie Claire is a 64year old femal mouth daily. , Disp: , Rfl:   Multiple Vitamins-Minerals (EYE VITAMINS OR), Take  by mouth., Disp: , Rfl:   Multiple Vitamin (MULTI-VITAMIN OR), Take  by mouth., Disp: , Rfl:   Ciclopirox 8 % External Solution, apply topically to affected nails every night NEUTROPHILS 04/22/2021 2,272    • ABSOLUTE LYMPHOCYTES 04/22/2021 2,220    • ABSOLUTE MONOCYTES 04/22/2021 536    • ABSOLUTE EOSINOPHILS 04/22/2021 130    • ABSOLUTE BASOPHILS 04/22/2021 42    • NEUTROPHILS 04/22/2021 43.7    • LYMPHOCYTES 04/22/2021 42.7 worsen or fail to improve. Isha Worley, DO [place] [facility]i Kenrick understands video or phone evaluation is not a substitute for face-to-face examination or emergency care.  Patient advised to go to ER or call 911 for worsening symptoms or acute distre

## 2022-03-01 ENCOUNTER — OFFICE VISIT (OUTPATIENT)
Dept: FAMILY MEDICINE CLINIC | Facility: CLINIC | Age: 77
End: 2022-03-01
Payer: MEDICARE

## 2022-03-01 ENCOUNTER — HOSPITAL ENCOUNTER (OUTPATIENT)
Dept: GENERAL RADIOLOGY | Age: 77
Discharge: HOME | End: 2022-03-01
Attending: FAMILY MEDICINE
Payer: MEDICARE

## 2022-03-01 VITALS
TEMPERATURE: 97 F | RESPIRATION RATE: 16 BRPM | OXYGEN SATURATION: 98 % | WEIGHT: 140.38 LBS | HEART RATE: 72 BPM | BODY MASS INDEX: 23.97 KG/M2 | HEIGHT: 64 IN | SYSTOLIC BLOOD PRESSURE: 126 MMHG | DIASTOLIC BLOOD PRESSURE: 70 MMHG

## 2022-03-01 DIAGNOSIS — S99.921A RIGHT FOOT INJURY, INITIAL ENCOUNTER: Primary | ICD-10-CM

## 2022-03-01 DIAGNOSIS — S99.921A RIGHT FOOT INJURY, INITIAL ENCOUNTER: ICD-10-CM

## 2022-03-01 PROCEDURE — 3074F SYST BP LT 130 MM HG: CPT | Performed by: FAMILY MEDICINE

## 2022-03-01 PROCEDURE — 99214 OFFICE O/P EST MOD 30 MIN: CPT | Performed by: FAMILY MEDICINE

## 2022-03-01 PROCEDURE — 3008F BODY MASS INDEX DOCD: CPT | Performed by: FAMILY MEDICINE

## 2022-03-01 PROCEDURE — 3078F DIAST BP <80 MM HG: CPT | Performed by: FAMILY MEDICINE

## 2022-03-01 PROCEDURE — 73630 X-RAY EXAM OF FOOT: CPT | Performed by: FAMILY MEDICINE

## 2022-03-01 RX ORDER — MELOXICAM 15 MG/1
15 TABLET ORAL DAILY
Qty: 30 TABLET | Refills: 0 | Status: SHIPPED | OUTPATIENT
Start: 2022-03-01

## 2022-04-14 ENCOUNTER — OFFICE VISIT (OUTPATIENT)
Dept: FAMILY MEDICINE CLINIC | Facility: CLINIC | Age: 77
End: 2022-04-14
Payer: MEDICARE

## 2022-04-14 VITALS
SYSTOLIC BLOOD PRESSURE: 121 MMHG | TEMPERATURE: 98 F | HEIGHT: 64.17 IN | HEART RATE: 85 BPM | WEIGHT: 137.19 LBS | OXYGEN SATURATION: 96 % | RESPIRATION RATE: 16 BRPM | DIASTOLIC BLOOD PRESSURE: 76 MMHG | BODY MASS INDEX: 23.42 KG/M2

## 2022-04-14 DIAGNOSIS — S99.921D INJURY OF RIGHT FOOT, SUBSEQUENT ENCOUNTER: ICD-10-CM

## 2022-04-14 DIAGNOSIS — M19.041 PRIMARY OSTEOARTHRITIS OF BOTH HANDS: ICD-10-CM

## 2022-04-14 DIAGNOSIS — Z00.00 ENCOUNTER FOR ANNUAL HEALTH EXAMINATION: Primary | ICD-10-CM

## 2022-04-14 DIAGNOSIS — E03.9 ACQUIRED HYPOTHYROIDISM: ICD-10-CM

## 2022-04-14 DIAGNOSIS — R92.2 DENSE BREAST TISSUE ON MAMMOGRAM: ICD-10-CM

## 2022-04-14 DIAGNOSIS — M19.042 PRIMARY OSTEOARTHRITIS OF BOTH HANDS: ICD-10-CM

## 2022-04-14 DIAGNOSIS — N95.2 VAGINAL ATROPHY: ICD-10-CM

## 2022-04-14 DIAGNOSIS — Z12.31 SCREENING MAMMOGRAM FOR BREAST CANCER: ICD-10-CM

## 2022-04-14 DIAGNOSIS — Z86.39 HISTORY OF VITAMIN D DEFICIENCY: ICD-10-CM

## 2022-04-14 DIAGNOSIS — M21.619 BUNION OF GREAT TOE: ICD-10-CM

## 2022-04-14 DIAGNOSIS — L98.9 SKIN LESION OF RIGHT LEG: ICD-10-CM

## 2022-04-14 DIAGNOSIS — G47.09 OTHER INSOMNIA: ICD-10-CM

## 2022-04-14 DIAGNOSIS — M85.80 OSTEOPENIA WITH HIGH RISK OF FRACTURE: ICD-10-CM

## 2022-04-14 DIAGNOSIS — R63.4 WEIGHT LOSS: ICD-10-CM

## 2022-04-14 PROBLEM — S99.921A RIGHT FOOT INJURY: Status: ACTIVE | Noted: 2022-03-01

## 2022-04-14 PROBLEM — U07.1 COVID-19: Status: RESOLVED | Noted: 2022-01-03 | Resolved: 2022-04-14

## 2022-04-14 PROCEDURE — G0439 PPPS, SUBSEQ VISIT: HCPCS | Performed by: FAMILY MEDICINE

## 2022-04-14 PROCEDURE — 3008F BODY MASS INDEX DOCD: CPT | Performed by: FAMILY MEDICINE

## 2022-04-14 PROCEDURE — 3074F SYST BP LT 130 MM HG: CPT | Performed by: FAMILY MEDICINE

## 2022-04-14 PROCEDURE — 96160 PT-FOCUSED HLTH RISK ASSMT: CPT | Performed by: FAMILY MEDICINE

## 2022-04-14 PROCEDURE — 99397 PER PM REEVAL EST PAT 65+ YR: CPT | Performed by: FAMILY MEDICINE

## 2022-04-14 PROCEDURE — 3078F DIAST BP <80 MM HG: CPT | Performed by: FAMILY MEDICINE

## 2022-04-14 RX ORDER — IBANDRONATE SODIUM 150 MG/1
150 TABLET, FILM COATED ORAL
Qty: 12 TABLET | Refills: 0 | Status: SHIPPED | OUTPATIENT
Start: 2022-04-14

## 2022-04-20 RX ORDER — IBANDRONATE SODIUM 150 MG/1
150 TABLET, FILM COATED ORAL
Qty: 12 TABLET | Refills: 0 | Status: SHIPPED | OUTPATIENT
Start: 2022-04-20

## 2022-04-20 NOTE — TELEPHONE ENCOUNTER
Spoke to pt verified medication request for Ibandronate to be [facility].   Rx sent to [facility] canceled, protocol passed Rx [facility] to [facility]

## 2022-04-22 LAB
ABSOLUTE BASOPHILS: 41 CELLS/UL (ref 0–200)
ABSOLUTE EOSINOPHILS: 184 CELLS/UL (ref 15–500)
ABSOLUTE LYMPHOCYTES: 2042 CELLS/UL (ref 850–3900)
ABSOLUTE MONOCYTES: 386 CELLS/UL (ref 200–950)
ABSOLUTE NEUTROPHILS: 1946 CELLS/UL (ref 1500–7800)
ALBUMIN/GLOBULIN RATIO: 2 (CALC) (ref 1–2.5)
ALBUMIN: 4.5 G/DL (ref 3.6–5.1)
ALKALINE PHOSPHATASE: 60 U/L (ref 37–153)
ALT: 14 U/L (ref 6–29)
AST: 18 U/L (ref 10–35)
BASOPHILS: 0.9 %
BILIRUBIN, TOTAL: 1 MG/DL (ref 0.2–1.2)
BUN: 13 MG/DL (ref 7–25)
CALCIUM: 9.7 MG/DL (ref 8.6–10.4)
CARBON DIOXIDE: 29 MMOL/L (ref 20–32)
CHLORIDE: 104 MMOL/L (ref 98–110)
CREATININE: 0.62 MG/DL (ref 0.6–0.93)
EGFR IF AFRICN AM: 100 ML/MIN/1.73M2
EGFR IF NONAFRICN AM: 86 ML/MIN/1.73M2
EOSINOPHILS: 4 %
GLOBULIN: 2.2 G/DL (CALC) (ref 1.9–3.7)
GLUCOSE: 90 MG/DL (ref 65–99)
HEMATOCRIT: 40.2 % (ref 35–45)
HEMOGLOBIN: 13.2 G/DL (ref 11.7–15.5)
LYMPHOCYTES: 44.4 %
MCH: 29.5 PG (ref 27–33)
MCHC: 32.8 G/DL (ref 32–36)
MCV: 89.9 FL (ref 80–100)
MONOCYTES: 8.4 %
MPV: 10 FL (ref 7.5–12.5)
NEUTROPHILS: 42.3 %
PLATELET COUNT: 223 THOUSAND/UL (ref 140–400)
POTASSIUM: 4.1 MMOL/L (ref 3.5–5.3)
PROTEIN, TOTAL: 6.7 G/DL (ref 6.1–8.1)
RDW: 11.9 % (ref 11–15)
RED BLOOD CELL COUNT: 4.47 MILLION/UL (ref 3.8–5.1)
SODIUM: 139 MMOL/L (ref 135–146)
T4, FREE: 1.3 NG/DL (ref 0.8–1.8)
TSH: 1.86 MIU/L (ref 0.4–4.5)
VITAMIN D, 25-OH, TOTAL: 55 NG/ML (ref 30–100)
WHITE BLOOD CELL COUNT: 4.6 THOUSAND/UL (ref 3.8–10.8)

## 2022-09-07 ENCOUNTER — OFFICE VISIT (OUTPATIENT)
Dept: FAMILY MEDICINE CLINIC | Facility: CLINIC | Age: 77
End: 2022-09-07
Payer: MEDICARE

## 2022-09-07 VITALS
DIASTOLIC BLOOD PRESSURE: 62 MMHG | OXYGEN SATURATION: 99 % | HEART RATE: 66 BPM | SYSTOLIC BLOOD PRESSURE: 124 MMHG | TEMPERATURE: 98 F | BODY MASS INDEX: 23 KG/M2 | WEIGHT: 135.19 LBS | RESPIRATION RATE: 16 BRPM

## 2022-09-07 DIAGNOSIS — H61.23 BILATERAL IMPACTED CERUMEN: Primary | ICD-10-CM

## 2022-09-07 PROCEDURE — 1125F AMNT PAIN NOTED PAIN PRSNT: CPT | Performed by: FAMILY MEDICINE

## 2022-09-07 PROCEDURE — 3074F SYST BP LT 130 MM HG: CPT | Performed by: FAMILY MEDICINE

## 2022-09-07 PROCEDURE — 99213 OFFICE O/P EST LOW 20 MIN: CPT | Performed by: FAMILY MEDICINE

## 2022-09-07 PROCEDURE — 3078F DIAST BP <80 MM HG: CPT | Performed by: FAMILY MEDICINE

## 2022-09-12 DIAGNOSIS — E03.9 ACQUIRED HYPOTHYROIDISM: ICD-10-CM

## 2022-09-12 RX ORDER — LEVOTHYROXINE SODIUM 88 UG/1
TABLET ORAL
Qty: 90 TABLET | Refills: 3 | OUTPATIENT
Start: 2022-09-12

## 2022-09-13 RX ORDER — LEVOTHYROXINE SODIUM 88 UG/1
88 TABLET ORAL
Qty: 90 TABLET | Refills: 3 | OUTPATIENT
Start: 2022-09-13

## 2022-09-14 RX ORDER — LEVOTHYROXINE SODIUM 88 UG/1
88 TABLET ORAL
Qty: 90 TABLET | Refills: 2 | Status: SHIPPED | OUTPATIENT
Start: 2022-09-14

## 2022-09-14 NOTE — TELEPHONE ENCOUNTER
Pt called [facility] stating [facility] its stating her thyroid medication was no sent for a year and pt wants to speak to a nurse in regards to this.

## 2022-10-24 ENCOUNTER — HOSPITAL ENCOUNTER (OUTPATIENT)
Dept: MAMMOGRAPHY | Age: 77
Discharge: HOME | End: 2022-10-24
Attending: FAMILY MEDICINE
Payer: MEDICARE

## 2022-10-24 DIAGNOSIS — Z12.31 SCREENING MAMMOGRAM FOR BREAST CANCER: ICD-10-CM

## 2022-10-24 PROCEDURE — 77067 SCR MAMMO BI INCL CAD: CPT | Performed by: FAMILY MEDICINE

## 2022-10-24 PROCEDURE — 77063 BREAST TOMOSYNTHESIS BI: CPT | Performed by: FAMILY MEDICINE

## 2023-03-06 ENCOUNTER — OFFICE VISIT (OUTPATIENT)
Dept: FAMILY MEDICINE CLINIC | Facility: CLINIC | Age: 78
End: 2023-03-06
Payer: MEDICARE

## 2023-03-06 VITALS
TEMPERATURE: 98 F | SYSTOLIC BLOOD PRESSURE: 126 MMHG | RESPIRATION RATE: 22 BRPM | OXYGEN SATURATION: 97 % | DIASTOLIC BLOOD PRESSURE: 80 MMHG | WEIGHT: 135 LBS | HEIGHT: 64 IN | BODY MASS INDEX: 23.05 KG/M2 | HEART RATE: 69 BPM

## 2023-03-06 DIAGNOSIS — J32.4 CHRONIC PANSINUSITIS: ICD-10-CM

## 2023-03-06 DIAGNOSIS — R26.89 BALANCE PROBLEM: ICD-10-CM

## 2023-03-06 DIAGNOSIS — M85.80 OSTEOPENIA WITH HIGH RISK OF FRACTURE: ICD-10-CM

## 2023-03-06 DIAGNOSIS — S16.1XXA STRAIN OF NECK MUSCLE, INITIAL ENCOUNTER: Primary | ICD-10-CM

## 2023-03-06 DIAGNOSIS — R53.83 LACKING IN ENERGY: ICD-10-CM

## 2023-03-06 PROCEDURE — 3079F DIAST BP 80-89 MM HG: CPT | Performed by: FAMILY MEDICINE

## 2023-03-06 PROCEDURE — 3008F BODY MASS INDEX DOCD: CPT | Performed by: FAMILY MEDICINE

## 2023-03-06 PROCEDURE — 99214 OFFICE O/P EST MOD 30 MIN: CPT | Performed by: FAMILY MEDICINE

## 2023-03-06 PROCEDURE — 3074F SYST BP LT 130 MM HG: CPT | Performed by: FAMILY MEDICINE

## 2023-03-06 PROCEDURE — 1125F AMNT PAIN NOTED PAIN PRSNT: CPT | Performed by: FAMILY MEDICINE

## 2023-03-06 RX ORDER — SENNOSIDES 8.6 MG
1300 CAPSULE ORAL EVERY 8 HOURS PRN
Qty: 180 TABLET | Refills: 0 | Status: SHIPPED | OUTPATIENT
Start: 2023-03-06

## 2023-03-06 RX ORDER — MENTHOL 40 MG/ML
1 GEL TOPICAL AS NEEDED
Qty: 90 G | Refills: 0 | Status: SHIPPED | OUTPATIENT
Start: 2023-03-06

## 2023-03-06 RX ORDER — ALENDRONATE SODIUM 70 MG/1
70 TABLET ORAL
Qty: 4 TABLET | Refills: 3 | Status: SHIPPED | OUTPATIENT
Start: 2023-03-06

## 2023-03-06 RX ORDER — KETOCONAZOLE 20 MG/ML
SHAMPOO TOPICAL
COMMUNITY
Start: 2022-11-16

## 2023-03-07 ENCOUNTER — HOSPITAL ENCOUNTER (OUTPATIENT)
Dept: GENERAL RADIOLOGY | Age: 78
Discharge: HOME | End: 2023-03-07
Attending: FAMILY MEDICINE
Payer: MEDICARE

## 2023-03-07 DIAGNOSIS — S16.1XXA STRAIN OF NECK MUSCLE, INITIAL ENCOUNTER: ICD-10-CM

## 2023-03-07 DIAGNOSIS — R26.89 BALANCE PROBLEM: ICD-10-CM

## 2023-03-07 PROCEDURE — 72050 X-RAY EXAM NECK SPINE 4/5VWS: CPT | Performed by: FAMILY MEDICINE

## 2023-03-09 ENCOUNTER — TELEPHONE (OUTPATIENT)
Dept: FAMILY MEDICINE CLINIC | Facility: CLINIC | Age: 78
End: 2023-03-09

## 2023-03-09 NOTE — TELEPHONE ENCOUNTER
----- Message from Isha Worley DO sent at 3/8/2023  8:09 PM CST -----  Results reviewed. Released to [address] Will discuss with patient at next visit. Eliot Cantor,  I have reviewed your test results. Cervical spine x-rays demonstrate mild osteoarthritis and degenerative changes of the spine. There is no cervical spinal stenosis or anything concerning. Recommend continuing with supportive care as discussed at the office visit-using Tylenol arthritis, over-the-counter Biofreeze and over-the-counter diclofenac/Voltaren cream.  Range of motion exercises and stretching of the neck are important. If symptoms do not improve then follow-up in the office we can discuss next steps and possibly physical therapy. If symptoms worsen or radiating down your arms to your hands or you are dropping objects then call office immediately or seek medical attention.   Sincerely,  Isha Worley DO

## 2023-03-13 NOTE — TELEPHONE ENCOUNTER
Pt wanted to make sure the lab work will include blood glucose test - noted that CMP is ordered, pt informed.

## 2023-03-16 LAB
ABSOLUTE BASOPHILS: 61 CELLS/UL (ref 0–200)
ABSOLUTE EOSINOPHILS: 153 CELLS/UL (ref 15–500)
ABSOLUTE LYMPHOCYTES: 2392 CELLS/UL (ref 850–3900)
ABSOLUTE MONOCYTES: 505 CELLS/UL (ref 200–950)
ABSOLUTE NEUTROPHILS: 1989 CELLS/UL (ref 1500–7800)
ALBUMIN/GLOBULIN RATIO: 2.2 (CALC) (ref 1–2.5)
ALBUMIN: 4.6 G/DL (ref 3.6–5.1)
ALKALINE PHOSPHATASE: 65 U/L (ref 37–153)
ALT: 18 U/L (ref 6–29)
AST: 20 U/L (ref 10–35)
BASOPHILS: 1.2 %
BILIRUBIN, TOTAL: 0.9 MG/DL (ref 0.2–1.2)
BUN: 16 MG/DL (ref 7–25)
CALCIUM: 9.6 MG/DL (ref 8.6–10.4)
CARBON DIOXIDE: 29 MMOL/L (ref 20–32)
CHLORIDE: 104 MMOL/L (ref 98–110)
CREATININE: 0.68 MG/DL (ref 0.6–1)
EGFR: 89 ML/MIN/1.73M2
EOSINOPHILS: 3 %
GLOBULIN: 2.1 G/DL (CALC) (ref 1.9–3.7)
GLUCOSE: 92 MG/DL (ref 65–99)
HEMATOCRIT: 39.2 % (ref 35–45)
HEMOGLOBIN: 12.9 G/DL (ref 11.7–15.5)
LYMPHOCYTES: 46.9 %
MCH: 29.9 PG (ref 27–33)
MCHC: 32.9 G/DL (ref 32–36)
MCV: 91 FL (ref 80–100)
MONOCYTES: 9.9 %
MPV: 10.1 FL (ref 7.5–12.5)
NEUTROPHILS: 39 %
PLATELET COUNT: 242 THOUSAND/UL (ref 140–400)
POTASSIUM: 4.2 MMOL/L (ref 3.5–5.3)
PROTEIN, TOTAL: 6.7 G/DL (ref 6.1–8.1)
RDW: 12.1 % (ref 11–15)
RED BLOOD CELL COUNT: 4.31 MILLION/UL (ref 3.8–5.1)
SODIUM: 140 MMOL/L (ref 135–146)
T4, FREE: 1.2 NG/DL (ref 0.8–1.8)
TSH W/REFLEX TO FT4: 4.74 MIU/L (ref 0.4–4.5)
VITAMIN B12: 478 PG/ML (ref 200–1100)
WHITE BLOOD CELL COUNT: 5.1 THOUSAND/UL (ref 3.8–10.8)

## 2023-03-20 ENCOUNTER — PATIENT MESSAGE (OUTPATIENT)
Dept: FAMILY MEDICINE CLINIC | Facility: CLINIC | Age: 78
End: 2023-03-20

## 2023-03-20 DIAGNOSIS — E03.9 ACQUIRED HYPOTHYROIDISM: Primary | ICD-10-CM

## 2023-03-20 NOTE — TELEPHONE ENCOUNTER
Please see mcm, patient has been compliant with medication. Pended increased dose of levothryoxine and repeat labs per Dr. Bette Burkitt result note.

## 2023-03-22 RX ORDER — LEVOTHYROXINE SODIUM 0.1 MG/1
100 TABLET ORAL DAILY
Qty: 90 TABLET | Refills: 0 | Status: SHIPPED | OUTPATIENT
Start: 2023-03-22 | End: 2024-03-16

## 2023-03-22 NOTE — TELEPHONE ENCOUNTER
Levothyroxine increased to 100 mcg daily. Must take 30 minutes prior to breakfast and other medications daily. And not miss doses. Please instruct patient to repeat TSH in 6 to 8 weeks on or after 5/8/2023.     Please inform

## 2023-03-27 ENCOUNTER — TELEPHONE (OUTPATIENT)
Dept: FAMILY MEDICINE CLINIC | Facility: CLINIC | Age: 78
End: 2023-03-27

## 2023-03-27 DIAGNOSIS — M85.80 OSTEOPENIA WITH HIGH RISK OF FRACTURE: ICD-10-CM

## 2023-03-27 RX ORDER — ALENDRONATE SODIUM 70 MG/1
70 TABLET ORAL
Qty: 12 TABLET | Refills: 0 | Status: SHIPPED | OUTPATIENT
Start: 2023-03-27

## 2023-03-27 RX ORDER — AMOXICILLIN 500 MG/1
500 CAPSULE ORAL 3 TIMES DAILY
COMMUNITY
Start: 2023-03-16

## 2023-04-29 ENCOUNTER — HOSPITAL ENCOUNTER (OUTPATIENT)
Age: 78
Discharge: HOME | End: 2023-04-29
Payer: MEDICARE

## 2023-04-29 VITALS
TEMPERATURE: 98 F | DIASTOLIC BLOOD PRESSURE: 60 MMHG | OXYGEN SATURATION: 96 % | SYSTOLIC BLOOD PRESSURE: 112 MMHG | HEIGHT: 64 IN | HEART RATE: 64 BPM | RESPIRATION RATE: 18 BRPM | BODY MASS INDEX: 23.39 KG/M2 | WEIGHT: 137 LBS

## 2023-04-29 DIAGNOSIS — R09.81 CONGESTION OF NASAL SINUS: Primary | ICD-10-CM

## 2023-04-29 DIAGNOSIS — U07.1 COVID-19: ICD-10-CM

## 2023-04-29 LAB — SARS-COV-2 RNA RESP QL NAA+PROBE: DETECTED

## 2023-04-29 PROCEDURE — U0002 COVID-19 LAB TEST NON-CDC: HCPCS | Performed by: PHYSICIAN ASSISTANT

## 2023-04-29 PROCEDURE — 99213 OFFICE O/P EST LOW 20 MIN: CPT | Performed by: PHYSICIAN ASSISTANT

## 2023-04-29 RX ORDER — NIRMATRELVIR AND RITONAVIR 300-100 MG
KIT ORAL
Qty: 30 TABLET | Refills: 0 | Status: SHIPPED | OUTPATIENT
Start: 2023-04-29 | End: 2023-05-04

## 2023-04-29 NOTE — ED INITIAL ASSESSMENT (HPI)
Pt c/o congestion and recent travel to [place], [address]  dx w/ COVID yesterday at [facility], [address] took at home COVID test that was positive

## 2023-05-11 ENCOUNTER — OFFICE VISIT (OUTPATIENT)
Dept: FAMILY MEDICINE CLINIC | Facility: CLINIC | Age: 78
End: 2023-05-11
Payer: MEDICARE

## 2023-05-11 VITALS
HEIGHT: 63.98 IN | SYSTOLIC BLOOD PRESSURE: 118 MMHG | BODY MASS INDEX: 23.36 KG/M2 | HEART RATE: 70 BPM | RESPIRATION RATE: 20 BRPM | DIASTOLIC BLOOD PRESSURE: 70 MMHG | TEMPERATURE: 98 F | OXYGEN SATURATION: 97 % | WEIGHT: 136.81 LBS

## 2023-05-11 DIAGNOSIS — Z00.00 ENCOUNTER FOR ANNUAL HEALTH EXAMINATION: Primary | ICD-10-CM

## 2023-05-11 DIAGNOSIS — M19.042 PRIMARY OSTEOARTHRITIS OF BOTH HANDS: ICD-10-CM

## 2023-05-11 DIAGNOSIS — M21.619 BUNION OF GREAT TOE: ICD-10-CM

## 2023-05-11 DIAGNOSIS — T50.905A MEDICATION SIDE EFFECT, INITIAL ENCOUNTER: ICD-10-CM

## 2023-05-11 DIAGNOSIS — E03.9 ACQUIRED HYPOTHYROIDISM: ICD-10-CM

## 2023-05-11 DIAGNOSIS — M19.041 PRIMARY OSTEOARTHRITIS OF BOTH HANDS: ICD-10-CM

## 2023-05-11 DIAGNOSIS — Z12.31 ENCOUNTER FOR SCREENING MAMMOGRAM FOR MALIGNANT NEOPLASM OF BREAST: ICD-10-CM

## 2023-05-11 DIAGNOSIS — M85.80 OSTEOPENIA WITH HIGH RISK OF FRACTURE: ICD-10-CM

## 2023-05-11 DIAGNOSIS — N95.2 VAGINAL ATROPHY: ICD-10-CM

## 2023-05-11 PROBLEM — G47.09 OTHER INSOMNIA: Status: RESOLVED | Noted: 2020-06-08 | Resolved: 2023-05-11

## 2023-05-11 PROBLEM — S16.1XXA CERVICAL STRAIN: Status: RESOLVED | Noted: 2023-03-06 | Resolved: 2023-05-11

## 2023-05-11 PROBLEM — J32.4 CHRONIC PANSINUSITIS: Status: RESOLVED | Noted: 2023-03-06 | Resolved: 2023-05-11

## 2023-05-11 PROBLEM — R53.83: Status: RESOLVED | Noted: 2023-03-06 | Resolved: 2023-05-11

## 2023-05-11 PROBLEM — R26.89 BALANCE PROBLEM: Status: RESOLVED | Noted: 2023-03-06 | Resolved: 2023-05-11

## 2023-05-11 PROBLEM — S99.921A RIGHT FOOT INJURY: Status: RESOLVED | Noted: 2022-03-01 | Resolved: 2023-05-11

## 2023-05-31 ENCOUNTER — TELEPHONE (OUTPATIENT)
Dept: FAMILY MEDICINE CLINIC | Facility: CLINIC | Age: 78
End: 2023-05-31

## 2023-05-31 DIAGNOSIS — E03.9 ACQUIRED HYPOTHYROIDISM: ICD-10-CM

## 2023-05-31 RX ORDER — LEVOTHYROXINE SODIUM 0.1 MG/1
TABLET ORAL
Qty: 90 TABLET | Refills: 3 | Status: SHIPPED | OUTPATIENT
Start: 2023-05-31

## 2023-05-31 NOTE — TELEPHONE ENCOUNTER
Patient called stating she would like to start Prolia injection due to Alledronate causing her body and muscle aches     I will summit verification and prior auth form for insurance approval

## 2023-05-31 NOTE — TELEPHONE ENCOUNTER
Thank you. Patient may start Prolia. She will need a CMP 1 or 2 days prior to the injection. Order placed. Please let patient know we will call her when medication arrives   and she should hold her alendronate in the interim.

## 2023-06-01 NOTE — TELEPHONE ENCOUNTER
Relayed to patient. She states she has appt for lab work tomorrow. She was advised to cancel and reschedule once she receives a call from the office that the medication is in. Patient verbalized understanding.

## 2023-06-08 ENCOUNTER — TELEPHONE (OUTPATIENT)
Dept: FAMILY MEDICINE CLINIC | Facility: CLINIC | Age: 78
End: 2023-06-08

## 2023-06-08 NOTE — TELEPHONE ENCOUNTER
PA forms for prolia reviewed completed and signed by provider Dr Herbert Kc. Completed forms faxed to [employer] at [phone].  Fax confirmed 6/2/23

## 2023-06-14 LAB
ALBUMIN/GLOBULIN RATIO: 2 (CALC) (ref 1–2.5)
ALBUMIN: 4.3 G/DL (ref 3.6–5.1)
ALKALINE PHOSPHATASE: 56 U/L (ref 37–153)
ALT: 16 U/L (ref 6–29)
AST: 18 U/L (ref 10–35)
BILIRUBIN, TOTAL: 0.9 MG/DL (ref 0.2–1.2)
BUN: 16 MG/DL (ref 7–25)
CALCIUM: 9.6 MG/DL (ref 8.6–10.4)
CARBON DIOXIDE: 30 MMOL/L (ref 20–32)
CHLORIDE: 105 MMOL/L (ref 98–110)
CREATININE: 0.66 MG/DL (ref 0.6–0.95)
EGFR: 89 ML/MIN/1.73M2
GLOBULIN: 2.2 G/DL (CALC) (ref 1.9–3.7)
GLUCOSE: 89 MG/DL (ref 65–99)
POTASSIUM: 4.2 MMOL/L (ref 3.5–5.3)
PROTEIN, TOTAL: 6.5 G/DL (ref 6.1–8.1)
SODIUM: 141 MMOL/L (ref 135–146)
T4, FREE: 1.4 NG/DL (ref 0.8–1.8)
TSH: 0.78 MIU/L (ref 0.4–4.5)

## 2023-06-16 ENCOUNTER — NURSE ONLY (OUTPATIENT)
Dept: FAMILY MEDICINE CLINIC | Facility: CLINIC | Age: 78
End: 2023-06-16
Payer: MEDICARE

## 2023-06-16 NOTE — PROGRESS NOTES
Prolia injection administered subcutaneously in Left upper arm, pt tolerated well. Next injection is due in 6 months after 12/22/2023.

## 2023-11-06 ENCOUNTER — HOSPITAL ENCOUNTER (OUTPATIENT)
Dept: MAMMOGRAPHY | Age: 78
Discharge: HOME | End: 2023-11-06
Attending: FAMILY MEDICINE
Payer: MEDICARE

## 2023-11-06 DIAGNOSIS — Z12.31 ENCOUNTER FOR SCREENING MAMMOGRAM FOR MALIGNANT NEOPLASM OF BREAST: ICD-10-CM

## 2023-11-06 PROCEDURE — 77063 BREAST TOMOSYNTHESIS BI: CPT | Performed by: FAMILY MEDICINE

## 2023-11-06 PROCEDURE — 77067 SCR MAMMO BI INCL CAD: CPT | Performed by: FAMILY MEDICINE

## 2023-12-11 ENCOUNTER — HOSPITAL ENCOUNTER (OUTPATIENT)
Dept: BONE DENSITY | Age: 78
Discharge: HOME | End: 2023-12-11
Attending: FAMILY MEDICINE
Payer: MEDICARE

## 2023-12-11 DIAGNOSIS — M85.80 OSTEOPENIA WITH HIGH RISK OF FRACTURE: ICD-10-CM

## 2023-12-11 PROCEDURE — 77080 DXA BONE DENSITY AXIAL: CPT | Performed by: FAMILY MEDICINE

## 2023-12-15 ENCOUNTER — TELEPHONE (OUTPATIENT)
Dept: FAMILY MEDICINE CLINIC | Facility: CLINIC | Age: 78
End: 2023-12-15

## 2023-12-19 NOTE — TELEPHONE ENCOUNTER
Pt called office asking for clarification on Dexa scan test results. Pt states she needs to speak to someone.

## 2023-12-20 NOTE — TELEPHONE ENCOUNTER
Pt called, states she needs clarification on DEXA scan results. Explained to pt that her T-score had improved. This means her bone density was better than last time she had a DEXA scan. PCP wants her to continue Vit B, Calcium, and weightbearing exereses. Pt reports she had Prolia shot on 6/16/2023. Wants to know if PCP still wants her to continue with the shots. Routed to provider for review and advice.

## 2023-12-21 ENCOUNTER — NURSE ONLY (OUTPATIENT)
Dept: FAMILY MEDICINE CLINIC | Facility: CLINIC | Age: 78
End: 2023-12-21
Payer: MEDICARE

## 2023-12-21 DIAGNOSIS — M85.80 OSTEOPENIA WITH HIGH RISK OF FRACTURE: Primary | ICD-10-CM

## 2023-12-21 NOTE — PROGRESS NOTES
Patient is present today to received Prolia injection, patient tolerated well . Injection given left subcutaneous         Injection is given every 6 months.  Next injection due 06/21/2023

## 2023-12-21 NOTE — TELEPHONE ENCOUNTER
Patient notified. She states she was not aware that she had to take Prolia longterm. She verbalized understanding and agrees with plan. Call transferred to front staff to schedule Prolia injection ASAP as she is now overdue. Routed to Dr Fernando Antonio - patient is asking if you recommend she get the RSV vaccine? Notes has already had Covid and Flu vaccines this season.

## 2023-12-21 NOTE — TELEPHONE ENCOUNTER
Yes, pt should continue with prolia  If she discontinues then she will lose bone mass    Please inform

## 2023-12-21 NOTE — TELEPHONE ENCOUNTER
Patient notified of below directives. She verbalized understanding and agrees with plan.  Plans to get RSV vaccine at [facility].

## 2023-12-21 NOTE — TELEPHONE ENCOUNTER
Please tell patient yes-that she needs to get an RSV vaccine. It is for all patients over age 61. There is a 7% risk of hospitalization with zeina the illness. It is a one-time vaccine and not annual.  It is in antigen-based do not mRNA and is similarly formulated like flu and other viral vaccines. Side effects have really been done in most of my patients. Patient may proceed with the RSV vaccine at local pharmacy. Thank you.

## 2023-12-22 PROCEDURE — 96372 THER/PROPH/DIAG INJ SC/IM: CPT | Performed by: FAMILY MEDICINE

## 2024-05-13 ENCOUNTER — OFFICE VISIT (OUTPATIENT)
Dept: FAMILY MEDICINE CLINIC | Facility: CLINIC | Age: 79
End: 2024-05-13

## 2024-05-13 VITALS
RESPIRATION RATE: 22 BRPM | HEIGHT: 63.78 IN | BODY MASS INDEX: 23.33 KG/M2 | DIASTOLIC BLOOD PRESSURE: 70 MMHG | TEMPERATURE: 98 F | OXYGEN SATURATION: 96 % | WEIGHT: 135 LBS | SYSTOLIC BLOOD PRESSURE: 121 MMHG | HEART RATE: 79 BPM

## 2024-05-13 DIAGNOSIS — M54.2 NECK PAIN ON RIGHT SIDE: ICD-10-CM

## 2024-05-13 DIAGNOSIS — K59.01 SLOW TRANSIT CONSTIPATION: ICD-10-CM

## 2024-05-13 DIAGNOSIS — M19.041 PRIMARY OSTEOARTHRITIS OF BOTH HANDS: ICD-10-CM

## 2024-05-13 DIAGNOSIS — Z00.00 ENCOUNTER FOR ANNUAL HEALTH EXAMINATION: Primary | ICD-10-CM

## 2024-05-13 DIAGNOSIS — M21.619 BUNION OF GREAT TOE: ICD-10-CM

## 2024-05-13 DIAGNOSIS — M19.042 PRIMARY OSTEOARTHRITIS OF BOTH HANDS: ICD-10-CM

## 2024-05-13 DIAGNOSIS — E03.9 ACQUIRED HYPOTHYROIDISM: ICD-10-CM

## 2024-05-13 DIAGNOSIS — M85.80 OSTEOPENIA WITH HIGH RISK OF FRACTURE: ICD-10-CM

## 2024-05-13 DIAGNOSIS — R20.2 TINGLING OF BOTH FEET: ICD-10-CM

## 2024-05-13 DIAGNOSIS — R14.0 ABDOMINAL BLOATING: ICD-10-CM

## 2024-05-13 PROCEDURE — G0439 PPPS, SUBSEQ VISIT: HCPCS | Performed by: FAMILY MEDICINE

## 2024-05-13 PROCEDURE — 96160 PT-FOCUSED HLTH RISK ASSMT: CPT | Performed by: FAMILY MEDICINE

## 2024-05-13 PROCEDURE — 99214 OFFICE O/P EST MOD 30 MIN: CPT | Performed by: FAMILY MEDICINE

## 2024-05-13 NOTE — PATIENT INSTRUCTIONS
Sakshi Gao's SCREENING SCHEDULE   Tests on this list are recommended by your physician but may not be covered, or covered at this frequency, by your insurer.   Please check with your insurance carrier before scheduling to verify coverage.   PREVENTATIVE SERVICES FREQUENCY &  COVERAGE DETAILS LAST COMPLETION DATE   Diabetes Screening    Fasting Blood Sugar /  Glucose    One screening every 12 months if never tested or if previously tested but not diagnosed with pre-diabetes   One screening every 6 months if diagnosed with pre-diabetes Lab Results   Component Value Date    GLU 89 06/13/2023        Cardiovascular Disease Screening    Lipid Panel  Cholesterol  Lipoprotein (HDL)  Triglycerides Covered every 5 years for all Medicare beneficiaries without apparent signs or symptoms of cardiovascular disease Lab Results   Component Value Date    CHOLEST 150 06/13/2020    HDL 56 06/13/2020    LDL 80 06/13/2020    TRIG 63 06/13/2020         Electrocardiogram (EKG)   Covered if needed at Welcome to Medicare, and non-screening if indicated for medical reasons -      Ultrasound Screening for Abdominal Aortic Aneurysm (AAA) Covered once in a lifetime for one of the following risk factors    Men who are 65-75 years old and have ever smoked    Anyone with a family history -     Colorectal Cancer Screening  Covered for ages 50-85; only need ONE of the following:    Colonoscopy   Covered every 10 years    Covered every 2 years if patient is at high risk or previous colonoscopy was abnormal -    No recommendations at this time    Flexible Sigmoidoscopy   Covered every 4 years -    Fecal Occult Blood Test Covered annually -   Bone Density Screening    Bone density screening    Covered every 2 years after age 65 if diagnosed with risk of osteoporosis or estrogen deficiency.    Covered yearly for long-term glucocorticoid medication use (Steroids) Last Dexa Scan:    XR DEXA BONE DENSITOMETRY (CPT=77080) 12/11/2023      No  recommendations at this time   Pap and Pelvic    Pap   Covered every 2 years for women at normal risk; Annually if at high risk -  No recommendations at this time    Chlamydia Annually if high risk -  No recommendations at this time   Screening Mammogram    Mammogram     Recommend annually for all female patients aged 40 and older    One baseline mammogram covered for patients aged 35-39 11/06/2023    No recommendations at this time    Immunizations    Influenza Covered once per flu season  Please get every year 09/19/2023  No recommendations at this time    Pneumococcal Each vaccine ([id number] & [id number]) covered once after 65 Prevnar 13: 05/09/2017    [id number]: 11/17/2015     No recommendations at this time    Hepatitis B One screening covered for patients with certain risk factors   01/10/2019  No recommendations at this time    Tetanus Toxoid Not covered by Medicare Part B unless medically necessary (cut with metal); may be covered with your pharmacy prescription benefits 09/03/2004    Tetanus, Diptheria and Pertusis TD and TDaP Not covered by Medicare Part B -  No recommendations at this time    Zoster Not covered by Medicare Part B; may be covered with your pharmacy  prescription benefits 12/23/2009  No recommendations at this time

## 2024-05-13 NOTE — PROGRESS NOTES
Subjective:   Sakshi Gao is a 78 year old female who presents for a MA (Medicare Advantage) Supervisit (Once per calendar year) and  addressed chronic conditions .     Pt had a history of hypothyroidism and here to recheck. Taking levothyroxine 100mcg daily and lethargy and fatigue have resolved.  Last TSH- 4.74 mIU/L on 3/2023-overdue for repeat labs  TSH (mIU/L)   Date Value   06/13/2023 0.78   04/21/2022 1.86   06/13/2020 1.29    Has been tolerating the medication well.. Denies any shakiness, palpitations, increased BM's or wgt loss. Denies any fatigue, wgt gain.    Osteopenia with high fall risk.  On Prolia x 1 year.  Patient is concerned about potential side effects.  Developed tingling in the bottoms of her feet over the past several months.  Has had some pains and aches in her legs and hips.  Denies any joint swelling or hand pain.  Concerned how Prolia works.  Uncertain if she should continue taking.  Was previously on alendronate then developed side effects-achiness in her bones and and digestive issues-stomach uneasiness and increased burping after ingestion.  Denies dysphagia, melena and epigastric pain.  Previously tried on Boniva monthly but side effects were worse.  Could not tolerate.  Denies any falls.  Denies fractures.  States balance is normal.   Last DEXA was 12/11/2023 with osteopenia of femoral neck with T-score -1.3 with a 6.3% change from prior examination, total hip T-score -0.4 and lumbar T score 1.1.  FRAX score  12% for 10-year risk and decreased risk of fracture with hip at 2.8%.    Bunion of great toe-managing.  Not ready to see podiatrist.  States does yoga and stretching exercises of the toe and helps the pain.    Primary osteoarthritis of both feet-states doing well rarely takes Tylenol.    Complains of right-sided neck pain radiating to her shoulder for 1 year.  Using Biofreeze but developed a rash.  She exercises 3 times a week in an weightbearing class, performs yoga 2-3  times a week and then walks at least 30 minutes several times a week.  States cannot sleep on her right side or it aggravates the pain.  Right neck and right upper extremity pain or not constant.  Can be triggered with abduction of the arm.  No soreness with rotation flexion or extension of the neck.  If she avoids her right side she will sleep well.  She is uncertain if she had any precipitating incident.  Denies any numbness or tingling or weakness of the upper extremities.  Has only tingling in both of her fee for several months.   denies any change in grasp strength.  Right-hand-dominant.  Still very active.  [employer] has a summer home in [place].    Abdominal bloating x 1 year.  Denies abdominal pain.  Denies fever chills or loss of appetite.  Uncertain what is causing it.  Denies any nausea or vomiting.  Tends to occur with intermittent constipation.  Most days goes daily but occasionally can skip a couple of days is concerned about what is triggering it.  When not constipated stools are normal size and caliber.  Brown stools.  No change in the color.  Denies any change of appetite or nausea or vomiting.  Does not track water intake.  States tries to drink water often.  Had some recent hot flashes.    Wt Readings from Last 6 Encounters:   05/13/24 135 lb (61.2 kg)   05/11/23 136 lb 12.8 oz (62.1 kg)   04/29/23 137 lb (62.1 kg)   03/06/23 135 lb (61.2 kg)   09/07/22 135 lb 3.2 oz (61.3 kg)   04/14/22 137 lb 3.2 oz (62.2 kg)       Health Maintenance   Topic Date Due    COVID-19 Vaccine (7 - 2023-24 season) 03/07/2024    Influenza Vaccine  Completed    DEXA Scan  Completed    MA Annual Health Assessment  Completed    Annual Depression Screening  Completed    Fall Risk Screening (Annual)  Completed    Pneumococcal Vaccine: 65+ Years  Completed    Zoster Vaccines  Completed           History/Other:   Fall Risk Assessment:   She has been screened for Falls and is low risk.      Cognitive Assessment:    She had a completely normal cognitive assessment - see flowsheet entries     Functional Ability/Status:   Sakshi Gao has a completely normal functional assessment. See flowsheet for details.        Depression Screening (PHQ-2/PHQ-9): PHQ-2 SCORE: 0  , done 5/13/2024   If you checked off any problems, how difficult have these problems made it for you to do your work, take care of things at home, or get along with other people?: Not difficult at all    Last [place] Suicide Screening on 5/13/2024 was No Risk.   Advanced Directives:   She has a Living Will on file in [employer]; reviewed and discussed documents with patient (and family/surrogate if present).  She has a Power of  for Health Care on file in [employer].  Patient has Advance Care Planning documents present in EMR. Reviewed documents with patient (and family/surrogate if present).      Patient Active Problem List   Diagnosis    Hypothyroidism    History of vitamin D deficiency    Vaginal atrophy    Bunion of great toe    Primary osteoarthritis of both hands    Osteopenia with high risk of fracture    DNR (do not resuscitate)     Allergies:  She is allergic to latex, sulfa antibiotics, sulfa drugs cross reactors, and latex.    Current Medications:  Outpatient Medications Marked as Taking for the 5/13/24 encounter (Office Visit) with Acacia Arndt DO   Medication Sig    LEVOTHYROXINE 100 MCG Oral Tab TAKE 1 TABLET DAILY    hydrocortisone 2.5 % External Cream Apply to Affected Area of face twice daily when flared, then as needed.    ketoconazole 2 % External Shampoo Apply to scalp 3-4 times a week as needed. Let sit 2-3 minutes then rinse. Use regular shampoo and conditioner after.    Acetaminophen ER (TYLENOL 8 HOUR ARTHRITIS PAIN) 650 MG Oral Tab CR Take 2 tablets (1,300 mg total) by mouth every 8 (eight) hours as needed for Pain (Max is 6 tablets in 24 hours or 4000 mg of Tylenol/acetaminophen in 24 hours). Buy over the counter    Calcium Carbonate  1500 (600 Ca) MG Oral Tab Take 600 mg by mouth.    Melatonin 5 MG Oral Tab 1/2 tab po nightly as needed    Cholecalciferol (VITAMIN D) 2000 units Oral Cap Take 1 capsule (2,000 Units total) by mouth daily. Adjusted on 6/3/2019 from 4000IU due to vit D level 70.    cetirizine 10 MG Oral Tab Take 1 tablet (10 mg total) by mouth daily.    Multiple Vitamins-Minerals (EYE VITAMINS OR) Take  by mouth.    Multiple Vitamin (MULTI-VITAMIN OR) Take  by mouth.       Medical History:  She  has a past medical history of Allergic rhinitis (), Anxiety, Atrophic vaginitis (2012), Chronic pansinusitis (3/6/2023), Corns and callosities (2012), COVID-19 (1/3/2022), Depression, Herpes zoster with other nervous system complications(053.), Hypothyroidism (), Left bundle branch block (LBBB) on electrocardiogram (2012), Left hand paresthesia (2020), Neoplasms of unspecified nature, other specified sites, Normal spontaneous vaginal delivery (), Osteoarthritis, Other malaise and fatigue (12/10/2011), Other screening mammogram (), Pap smear, as part of routine gynecological examination (), Personal history of estrogen therapy, Seasonal allergies, and Special screening for osteoporosis (02/15/2011).  Surgical History:  She  has a past surgical history that includes colonoscopy (2003); tonsillectomy; colonoscopy (3/12/13); thierno localization wire 1 site left (cpt=19281) (Left, ); adenoidectomy; thierno localization wire 1 site right (cpt=19281) (Right, ); cataract (2020); colonoscopy; and .   Family History:  Her family history includes Breast Cancer (age of onset: 93) in her mother; Cancer (age of onset: 68) in her brother; Dementia in her father; Diabetes in her father, maternal grandmother, maternal uncle, and paternal grandmother; Hypertension in her father and mother; Other in her mother.  Social History:  She  reports that she quit smoking about 60 years ago. Her smoking use  included cigarettes. She started smoking about 62 years ago. She has a 0.6 pack-year smoking history. She has never used smokeless tobacco. She reports current alcohol use. She reports that she does not use drugs.    Tobacco:  She smoked tobacco in the past but quit greater than 12 months ago.  Social History     Tobacco Use   Smoking Status Former    Current packs/day: 0.00    Average packs/day: 0.3 packs/day for 2.0 years (0.6 ttl pk-yrs)    Types: Cigarettes    Start date: 1962    Quit date: 1964    Years since quittin.4   Smokeless Tobacco Never   Tobacco Comments    I was in college, everyone smoked.          CAGE Alcohol Screen:   CAGE screening score of 0 on 2024, showing low risk of alcohol abuse.      Patient Care Team:  Acacia Arndt DO as PCP - General (Family Practice)    Review of Systems  GENERAL: feels well otherwise  SKIN: denies any unusual skin lesions  EYES: denies blurred vision or double vision  HEENT: denies nasal congestion, sinus pain or ST  LUNGS: denies shortness of breath with exertion  CARDIOVASCULAR: denies chest pain on exertion  GI: denies abdominal pain, denies heartburn  : denies dysuria, vaginal discharge or itching, no complaint of urinary incontinence   MUSCULOSKELETAL: denies back pain  NEURO: denies headaches  PSYCHE: denies depression or anxiety  HEMATOLOGIC: denies hx of anemia  ENDOCRINE: denies thyroid history  ALL/ASTHMA: denies hx of allergy or asthma    Objective:   Physical Exam  General Appearance:  Alert, cooperative, no distress, appears stated age   Head:  Normocephalic, without obvious abnormality, atraumatic   Eyes:  PERRL, conjunctiva/corneas clear, EOM's intact both eyes   Ears:  Normal TM's and external ear canals, both ears   Nose: Nares normal, septum midline,mucosa normal, no drainage or sinus tenderness   Throat: Lips, mucosa, and tongue normal; teeth and gums normal   Neck: Supple, symmetrical, trachea midline, no adenopathy;  thyroid:  not enlarged, symmetric, no mass/nodules; no carotid bruit or JVD right lateral neck Paravertebrally tender with palpation from C4-7.ROM normal of cervical spine.  Negative Lhermitte sign.   Back:   Symmetric, no curvature, ROM normal, no CVA tenderness   Lungs:   Clear to auscultation bilaterally, respirations unlabored   Heart:  Regular rate and rhythm, S1 and S2 normal, no murmur, rub, or gallop   Abdomen:   Soft, non-tender, bowel sounds active all four quadrants,  no masses, no organomegaly   Pelvic: Deferred   Extremities: Extremities normal, atraumatic, no cyanosis or edema.  Right rotator cuff nontender.  ROM of shoulders normal bilateral.  Negative impingement bilateral.  Right upper extremity anteriorly some discomfort over the distal deltoid anterior bicep.  Negative impingement sign.  Bilateral feet warm.   Pulses: 2+ and symmetric-carotid, radial and pedal-all strong   Skin: Skin color, texture, turgor normal, no rashes or lesions   Lymph nodes: Cervical, supraclavicular, and axillary nodes normal   Neurologic: Normal       /70 (BP Location: Left arm, Patient Position: Sitting, Cuff Size: adult)   Pulse 79   Temp 98.1 °F (36.7 °C) (Temporal)   Resp 22   Ht 5' 3.78\" (1.62 m)   Wt 135 lb (61.2 kg)   LMP 10/25/1998 (LMP Unknown)   SpO2 96%   BMI 23.33 kg/m²  Estimated body mass index is 23.33 kg/m² as calculated from the following:    Height as of this encounter: 5' 3.78\" (1.62 m).    Weight as of this encounter: 135 lb (61.2 kg).    Medicare Hearing Assessment:   Hearing Screening    Screening Method: Finger Rub  Finger Rub Result: Pass         Visual Acuity:   Right Eye Visual Acuity: Uncorrected Right Eye Chart Acuity: 20/20   Left Eye Visual Acuity: Uncorrected Left Eye Chart Acuity: 20/20   Both Eyes Visual Acuity: Uncorrected Both Eyes Chart Acuity: 20/20   Able To Tolerate Visual Acuity: Yes        Assessment & Plan:   Sakshi Gao is a 78 year old female who presents for a  Medicare Assessment.     1. Encounter for annual health examination (Primary)  2. Acquired hypothyroidism  -     Detailed, Mod Complex (63786)  -     Comp Metabolic Panel (14)  -     TSH W Reflex To Free T4  -     CBC With Differential With Platelet  Euthymic  Continue levothyroxine  Complete labs  3. Osteopenia with high risk of fracture  -     Detailed, Mod Complex (44517)  -     CBC With Differential With Platelet  -     Denosumab  Patient at high risk of hip fracture 3% was started in 2022 on Boniva but had side effects and changed to Fosamax and now on Prolia complaining of some increased aches in her legs.  Possible tingling in her feet could be from Prolia.  This is listed as a side effect but not common.  Discussed how medication works-is a monoclonal antibody specific to the receptor RANKL-which suppresses bone resorption by inhibiting the osteoclast.  Discussed that osteoclast breakdown bone.  If patient fracture of the hip there is greater than 40% mortality and morbidity that high risk of permanent disability and chronic pain.  It is possible to have some achiness in the hips or low back with Prolia.  However patient is fairly active and is 78 years old will have aches and pains with aging.  Is difficult to discern if this is due to the medication or activity.  Continue vitamin D3 and calcium  Continue weightbearing exercises and senior exercise class 3x times a week  Agrees to continue with Prolia.    4. Primary osteoarthritis of both hands  Overview:  Hands and feet. Use ibuprofen as needed.  Orders:  -     Detailed, Mod Complex (21267)  Controlled  Currently not a concern  Tylenol arthritis if needed    5. Tingling of both feet  -     CBC With Differential With Platelet  -     Vitamin B12  -     Neuro Referral - In Network  Last B12 3/15/2023 for 78 which is normal.  Unlikely causing tingling of the feet but will recheck.  Also monitor CBC for any anemia.  Will check thyroid but historically has been  controlled on current dose and not missing doses.  Patient has no evidence of diabetes, has good pedal pulses and has normal cholesterol and non-smoker unlikely has atherosclerosis.  There is no claudication  May need EMG and recommend neuro consult  If EMGs are normal and tingling in the extremities is bothersome enough to interfere with daily activity, then would recommend a trial off of Prolia to see if symptoms subside.  Again there is a risk versus benefit.  Discussed at length with patient    6. Bunion of great toe  Comments:  right  Orders:  -     Detailed, Mod Complex (75744)  Controlled  Continue Tylenol as needed  7. Neck pain on right side  -     XR CERVICAL SPINE (4VIEWS) (CPT=72050); Future; Expected date: 05/13/2024  -     Diclofenac Sodium; Apply 2 g topically 4 (four) times daily as needed. Buy otc  Dispense: 150 g; Refill: 3  -     Physical Therapy Referral - [facility]  -     Neuro Referral - In Network  Suspect some osteoarthritis  No change in pillows or sleeping position  Typically prefers to sleep on side  Neurovascular intact  Complete x-ray use topical diclofenac and start physical therapy  May see neurologist  8. Abdominal bloating  9. Slow transit constipation  Keep diary of activity, water intake, foods consumed to try to associate possibly there is a change in behavior associated with constipation when occurs.  If symptoms worsen or change then will refer to GI may need colonoscopy but there is no report of blood.    The patient indicates understanding of these issues and agrees to the plan.  Imaging studies ordered.  Lab work ordered.  Prescription medication ordered.  Reinforced healthy diet, lifestyle, and exercise.      Return in about 6 weeks (around 6/21/2024) for recheck symptoms, prolia shot.     Acacia Arndt DO, 5/13/2024     Supplementary Documentation:   General Health:  In the past six months, have you lost more than 10 pounds without trying?: 2 - No  Has your appetite  been poor?: No  Type of Diet: Balanced  How does the patient maintain a good energy level?: Appropriate Exercise  How would you describe your daily physical activity?: Moderate  How would you describe your current health state?: Good  How do you maintain positive mental well-being?: Social Interaction;Puzzles;Visiting Friends;Visiting Family  On a scale of 0 to 10, with 0 being no pain and 10 being severe pain, what is your pain level?: 1 - (Mild)  In the past six months, have you experienced urine leakage?: 0-No  At any time do you feel concerned for the safety/well-being of yourself and/or your children, in your home or elsewhere?: No  Have you had any immunizations at another office such as Influenza, Hepatitis B, Tetanus, or Pneumococcal?: No       Sakshi Gao's SCREENING SCHEDULE   Tests on this list are recommended by your physician but may not be covered, or covered at this frequency, by your insurer.   Please check with your insurance carrier before scheduling to verify coverage.   PREVENTATIVE SERVICES FREQUENCY &  COVERAGE DETAILS LAST COMPLETION DATE   Diabetes Screening    Fasting Blood Sugar /  Glucose    One screening every 12 months if never tested or if previously tested but not diagnosed with pre-diabetes   One screening every 6 months if diagnosed with pre-diabetes Lab Results   Component Value Date    GLU 89 06/13/2023        Cardiovascular Disease Screening    Lipid Panel  Cholesterol  Lipoprotein (HDL)  Triglycerides Covered every 5 years for all Medicare beneficiaries without apparent signs or symptoms of cardiovascular disease Lab Results   Component Value Date    CHOLEST 150 06/13/2020    HDL 56 06/13/2020    LDL 80 06/13/2020    TRIG 63 06/13/2020         Electrocardiogram (EKG)   Covered if needed at Welcome to Medicare, and non-screening if indicated for medical reasons -      Ultrasound Screening for Abdominal Aortic Aneurysm (AAA) Covered once in a lifetime for one of the  following risk factors    Men who are 65-75 years old and have ever smoked    Anyone with a family history -     Colorectal Cancer Screening  Covered for ages 50-85; only need ONE of the following:    Colonoscopy   Covered every 10 years    Covered every 2 years if patient is at high risk or previous colonoscopy was abnormal -    No recommendations at this time    Flexible Sigmoidoscopy   Covered every 4 years -    Fecal Occult Blood Test Covered annually -   Bone Density Screening    Bone density screening    Covered every 2 years after age 65 if diagnosed with risk of osteoporosis or estrogen deficiency.    Covered yearly for long-term glucocorticoid medication use (Steroids) Last Dexa Scan:    XR DEXA BONE DENSITOMETRY (CPT=77080) 12/11/2023      No recommendations at this time   Pap and Pelvic    Pap   Covered every 2 years for women at normal risk; Annually if at high risk -  No recommendations at this time    Chlamydia Annually if high risk -  No recommendations at this time   Screening Mammogram    Mammogram     Recommend annually for all female patients aged 40 and older    One baseline mammogram covered for patients aged 35-39 11/06/2023    No recommendations at this time    Immunizations    Influenza Covered once per flu season  Please get every year 09/19/2023  No recommendations at this time    Pneumococcal Each vaccine ([id number] & [id number]) covered once after 65 Prevnar 13: 05/09/2017    [id number]: 11/17/2015     No recommendations at this time    Hepatitis B One screening covered for patients with certain risk factors   01/10/2019  No recommendations at this time    Tetanus Toxoid Not covered by Medicare Part B unless medically necessary (cut with metal); may be covered with your pharmacy prescription benefits 09/03/2004    Tetanus, Diptheria and Pertusis TD and TDaP Not covered by Medicare Part B -  No recommendations at this time    Zoster Not covered by Medicare Part B; may be covered with  your pharmacy  prescription benefits 12/23/2009  No recommendations at this time

## 2024-05-14 DIAGNOSIS — E03.9 ACQUIRED HYPOTHYROIDISM: ICD-10-CM

## 2024-05-15 RX ORDER — LEVOTHYROXINE SODIUM 0.1 MG/1
TABLET ORAL
Qty: 90 TABLET | Refills: 0 | Status: SHIPPED | OUTPATIENT
Start: 2024-05-15

## 2024-05-15 NOTE — TELEPHONE ENCOUNTER
Refill Passed Protocol:     Pt requesting refill of   Requested Prescriptions     Pending Prescriptions Disp Refills    LEVOTHYROXINE 100 MCG Oral Tab [Pharmacy Med Name: L-THYROXINE (SYNTHROID) TABS 100MCG] 90 tablet 0     Sig: TAKE 1 TABLET DAILY      Refill was approved and sent to pharmacy:     Last Time Medication was Filled:  5/31/2023    Last Office Visit with Provider: 5/13/2024    Appt. scheduled on 6/20/2024

## 2024-05-16 ENCOUNTER — HOSPITAL ENCOUNTER (OUTPATIENT)
Dept: GENERAL RADIOLOGY | Age: 79
Discharge: HOME | End: 2024-05-16
Attending: FAMILY MEDICINE

## 2024-05-16 DIAGNOSIS — M54.2 NECK PAIN ON RIGHT SIDE: ICD-10-CM

## 2024-05-16 PROCEDURE — 72050 X-RAY EXAM NECK SPINE 4/5VWS: CPT | Performed by: FAMILY MEDICINE

## 2024-05-17 LAB
ABSOLUTE BASOPHILS: 38 CELLS/UL (ref 0–200)
ABSOLUTE EOSINOPHILS: 108 CELLS/UL (ref 15–500)
ABSOLUTE LYMPHOCYTES: 1687 CELLS/UL (ref 850–3900)
ABSOLUTE MONOCYTES: 376 CELLS/UL (ref 200–950)
ABSOLUTE NEUTROPHILS: 2491 CELLS/UL (ref 1500–7800)
ALBUMIN/GLOBULIN RATIO: 2 (CALC) (ref 1–2.5)
ALBUMIN: 4.4 G/DL (ref 3.6–5.1)
ALKALINE PHOSPHATASE: 45 U/L (ref 37–153)
ALT: 14 U/L (ref 6–29)
AST: 17 U/L (ref 10–35)
BASOPHILS: 0.8 %
BILIRUBIN, TOTAL: 1 MG/DL (ref 0.2–1.2)
BUN: 15 MG/DL (ref 7–25)
CALCIUM: 9.5 MG/DL (ref 8.6–10.4)
CARBON DIOXIDE: 28 MMOL/L (ref 20–32)
CHLORIDE: 104 MMOL/L (ref 98–110)
CREATININE: 0.67 MG/DL (ref 0.6–0.95)
EGFR: 88 ML/MIN/1.73M2
EOSINOPHILS: 2.3 %
GLOBULIN: 2.2 G/DL (CALC) (ref 1.9–3.7)
GLUCOSE: 88 MG/DL (ref 65–99)
HEMATOCRIT: 40.3 % (ref 35–45)
HEMOGLOBIN: 12.8 G/DL (ref 11.7–15.5)
LYMPHOCYTES: 35.9 %
MCH: 28.8 PG (ref 27–33)
MCHC: 31.8 G/DL (ref 32–36)
MCV: 90.8 FL (ref 80–100)
MONOCYTES: 8 %
MPV: 10.2 FL (ref 7.5–12.5)
NEUTROPHILS: 53 %
PLATELET COUNT: 235 THOUSAND/UL (ref 140–400)
POTASSIUM: 4.1 MMOL/L (ref 3.5–5.3)
PROTEIN, TOTAL: 6.6 G/DL (ref 6.1–8.1)
RDW: 12 % (ref 11–15)
RED BLOOD CELL COUNT: 4.44 MILLION/UL (ref 3.8–5.1)
SODIUM: 140 MMOL/L (ref 135–146)
TSH W/REFLEX TO FT4: 1.79 MIU/L (ref 0.4–4.5)
VITAMIN B12: 552 PG/ML (ref 200–1100)
WHITE BLOOD CELL COUNT: 4.7 THOUSAND/UL (ref 3.8–10.8)

## 2024-05-20 ENCOUNTER — OFFICE VISIT (OUTPATIENT)
Dept: FAMILY MEDICINE CLINIC | Facility: CLINIC | Age: 79
End: 2024-05-20

## 2024-05-20 VITALS
WEIGHT: 135.81 LBS | SYSTOLIC BLOOD PRESSURE: 132 MMHG | HEIGHT: 63.78 IN | DIASTOLIC BLOOD PRESSURE: 68 MMHG | BODY MASS INDEX: 23.47 KG/M2

## 2024-05-20 DIAGNOSIS — H00.012 HORDEOLUM EXTERNUM OF RIGHT LOWER EYELID: Primary | ICD-10-CM

## 2024-05-20 DIAGNOSIS — L03.213 PERIORBITAL CELLULITIS OF RIGHT EYE: ICD-10-CM

## 2024-05-20 PROCEDURE — 99213 OFFICE O/P EST LOW 20 MIN: CPT | Performed by: FAMILY MEDICINE

## 2024-05-20 RX ORDER — OFLOXACIN 3 MG/ML
SOLUTION/ DROPS OPHTHALMIC
Qty: 10 ML | Refills: 0 | Status: SHIPPED | OUTPATIENT
Start: 2024-05-20

## 2024-05-20 RX ORDER — AMOXICILLIN AND CLAVULANATE POTASSIUM 875; 125 MG/1; MG/1
1 TABLET, FILM COATED ORAL 2 TIMES DAILY
Qty: 14 TABLET | Refills: 0 | Status: SHIPPED | OUTPATIENT
Start: 2024-05-20

## 2024-05-20 NOTE — PROGRESS NOTES
CHIEF COMPLAINT:   Chief Complaint   Patient presents with    Eye Problem     Stye in lower right eye for last 3 days.         HPI:     Sakshi Gao is a 79 year old female presents for right lower lid x 3 days. Started warm compresses yesterday and lower eye stye more pronounced today this am. Feels painful. Denies fever, chills. Slight headache today but has seasonal allergies. Hx of stye. Denies vision changes.    HISTORY:  Past Medical History:    Allergic rhinitis    Anxiety    Atrophic vaginitis    Chronic pansinusitis    Corns and callosities    COVID-19    1/3/2022    Depression    Herpes zoster with other nervous system complications(053.19)    Hypothyroidism    acquired    Left bundle branch block (LBBB) on electrocardiogram    Left hand paresthesia    left middle, ring, 5th finger- tingling, no weakness or numbness. Resolved 2020. Cancel EMG appt 20.     Neoplasms of unspecified nature, other specified sites    Normal spontaneous vaginal delivery (HCC)    Osteoarthritis    Other malaise and fatigue    Other screening mammogram    Pap smear, as part of routine gynecological examination    Personal history of estrogen therapy    Seasonal allergies    Special screening for osteoporosis      Past Surgical History:   Procedure Laterality Date    Adenoidectomy      Cataract  2020    Colonoscopy  2003    Colonoscopy  3/12/13    Colonoscopy      Willa localization wire 1 site left (cpt=19281) Left     benign.    Willa localization wire 1 site right (cpt=19281) Right     benign.          Tonsillectomy        Family History   Problem Relation Age of Onset    Dementia Father     Diabetes Father         mellitus    Hypertension Father     Hypertension Mother     Breast Cancer Mother 93        brca at age 92, passed away at age 96.  possible trauma as cause.    Other (Other) Mother     Diabetes Maternal Grandmother     Diabetes Paternal Grandmother     Cancer Brother 68         melanoma.     Diabetes Maternal Uncle       Social History:   Social History     Socioeconomic History    Marital status:    Tobacco Use    Smoking status: Former     Current packs/day: 0.00     Average packs/day: 0.3 packs/day for 2.0 years (0.6 ttl pk-yrs)     Types: Cigarettes     Start date: 1962     Quit date: 1964     Years since quittin.4     Passive exposure: Past    Smokeless tobacco: Never    Tobacco comments:     I was in college, everyone smoked.   Vaping Use    Vaping status: Never Used   Substance and Sexual Activity    Alcohol use: Yes     Comment: Occasional glass of wine    Drug use: Never   Other Topics Concern    Caffeine Concern No    Stress Concern Yes    Weight Concern No    Special Diet No    Exercise Yes    Seat Belt Yes        Medications (Active prior to today's visit):  Current Outpatient Medications   Medication Sig Dispense Refill    levothyroxine 100 MCG Oral Tab TAKE 1 TABLET DAILY 90 tablet 0    diclofenac 1 % External Gel Apply 2 g topically 4 (four) times daily as needed. Buy otc 150 g 3    hydrocortisone 2.5 % External Cream Apply to Affected Area of face twice daily when flared, then as needed.      ketoconazole 2 % External Shampoo Apply to scalp 3-4 times a week as needed. Let sit 2-3 minutes then rinse. Use regular shampoo and conditioner after.      Acetaminophen ER (TYLENOL 8 HOUR ARTHRITIS PAIN) 650 MG Oral Tab CR Take 2 tablets (1,300 mg total) by mouth every 8 (eight) hours as needed for Pain (Max is 6 tablets in 24 hours or 4000 mg of Tylenol/acetaminophen in 24 hours). Buy over the counter 180 tablet 0    Calcium Carbonate 1500 (600 Ca) MG Oral Tab Take 600 mg by mouth.      Melatonin 5 MG Oral Tab 1/2 tab po nightly as needed 30 tablet 0    Cholecalciferol (VITAMIN D) 2000 units Oral Cap Take 1 capsule (2,000 Units total) by mouth daily. Adjusted on 6/3/2019 from 4000IU due to vit D level 70. 30 capsule 0    cetirizine 10 MG Oral Tab Take 1 tablet  (10 mg total) by mouth daily.      Multiple Vitamins-Minerals (EYE VITAMINS OR) Take  by mouth.      Multiple Vitamin (MULTI-VITAMIN OR) Take  by mouth.         Allergies:  Allergies   Allergen Reactions    Latex OTHER (SEE COMMENTS)    Sulfa Antibiotics HIVES    Sulfa Drugs Cross Reactors HIVES    Latex      Smell lingers, bothersome       PSFH elements reviewed from today and agreed except as otherwise stated in HPI.  PHYSICAL EXAM:   /68   Ht 5' 3.78\" (1.62 m)   Wt 135 lb 12.8 oz (61.6 kg)   LMP 10/25/1998 (LMP Unknown)   BMI 23.47 kg/m²   Vital signs reviewed.Appears stated age, well groomed.  Physical Exam   General: Well-nourished, well hydrated. No acute distress. No pallor. No tachypnea. Non toxic.  Right lower mid eyelid is a 3 mm raised whitish pustule with septal divisions.  Right lower periumbilical area is mildly edematous and tender.  Right upper eyelid is nontender or swollen.  Left eye is completely clear upper and lower eyelids.  HEENT: normocephaly, atraumatic.  Sclera clear and non icteric bilaterally.  Oral pharynx clear without normal finding.  Moist mucous membranes.  Neck: supple. No adenopathy. No thyromegaly.   Heart: RRR without S3 or S4 murmur . Clear S1S2  Lungs: clear to auscultation bilaterally. No rales, rhonchi or wheezes. Good inspiratory and expiratory effort  Skin:    LABS        REVIEWED THIS VISIT  ASSESSMENT/PLAN:   79 year old female with    1. Hordeolum externum of right lower eyelid  - ofloxacin 0.3 % Ophthalmic Solution; 1 gtt to affected eye(s) every 2 hours x 2 days then 1 gtt affected eye(s) every 6hours x 5 days- total is 7 days.  Dispense: 10 mL; Refill: 0  Warm compresses 4 times a day  Allow to drain  Do not squeeze or attempt to self during  Follow-up in 1 week if not resolved sooner if worse    2. Periorbital cellulitis of right eye  - amoxicillin clavulanate 875-125 MG Oral Tab; Take 1 tablet by mouth 2 (two) times daily.  Dispense: 14 tablet; Refill:  0  Unlikely shingles-vaccinated with Shingrix  However if any spreading rash or new lesions then needs OV immediately.      Meds This Visit:  Requested Prescriptions     Signed Prescriptions Disp Refills    ofloxacin 0.3 % Ophthalmic Solution 10 mL 0     Si gtt to affected eye(s) every 2 hours x 2 days then 1 gtt affected eye(s) every 6hours x 5 days- total is 7 days.    amoxicillin clavulanate 875-125 MG Oral Tab 14 tablet 0     Sig: Take 1 tablet by mouth 2 (two) times daily.       Health Maintenance:  Health Maintenance   Topic Date Due    COVID-19 Vaccine (2023- season) 2024    Influenza Vaccine  Completed    DEXA Scan  Completed    MA Annual Health Assessment  Completed    Annual Depression Screening  Completed    Fall Risk Screening (Annual)  Completed    Pneumococcal Vaccine: 65+ Years  Completed    Zoster Vaccines  Completed         Patient/Caregiver Education: Patient/Caregiver Education: There are no barriers to learning. Medical education done.   Outcome: Patient verbalizes understanding. Patient is notified to call with any questions, comp lications, allergies, or worsening or changing symptoms.  Patient is to call with any side effects or complications from the treatments as a result of today.     Problem List:     Patient Active Problem List   Diagnosis    Hypothyroidism    History of vitamin D deficiency    Vaginal atrophy    Bunion of great toe    Primary osteoarthritis of both hands    Osteopenia with high risk of fracture    DNR (do not resuscitate)    Slow transit constipation    Abdominal bloating    Neck pain on right side    Tingling of both feet       Imaging & Referrals:  None     2024  Acacia Arndt, DO      Patient understands plan and follow-up.  Return in about 1 week (around 2024), or if symptoms worsen or fail to improve.

## 2024-05-28 ENCOUNTER — TELEPHONE (OUTPATIENT)
Dept: FAMILY MEDICINE CLINIC | Facility: CLINIC | Age: 79
End: 2024-05-28

## 2024-05-28 ENCOUNTER — HOSPITAL ENCOUNTER (OUTPATIENT)
Age: 79
Discharge: HOME | End: 2024-05-28

## 2024-05-28 VITALS
DIASTOLIC BLOOD PRESSURE: 88 MMHG | SYSTOLIC BLOOD PRESSURE: 159 MMHG | HEART RATE: 70 BPM | OXYGEN SATURATION: 99 % | TEMPERATURE: 98 F | RESPIRATION RATE: 20 BRPM

## 2024-05-28 DIAGNOSIS — H00.012 HORDEOLUM EXTERNUM OF RIGHT LOWER EYELID: Primary | ICD-10-CM

## 2024-05-28 DIAGNOSIS — L03.213 PERIORBITAL CELLULITIS OF LEFT EYE: ICD-10-CM

## 2024-05-28 PROCEDURE — 99213 OFFICE O/P EST LOW 20 MIN: CPT | Performed by: PHYSICIAN ASSISTANT

## 2024-05-28 RX ORDER — CLINDAMYCIN HYDROCHLORIDE 300 MG/1
300 CAPSULE ORAL 3 TIMES DAILY
Qty: 21 CAPSULE | Refills: 0 | Status: SHIPPED | OUTPATIENT
Start: 2024-05-28 | End: 2024-06-04

## 2024-05-28 NOTE — TELEPHONE ENCOUNTER
Pt was seen for periorbital cellulitis and stye on 05/20. Today reports her eye is better, has less swelling, and redness but stye is still there.   Pt has completed Augmentin, continues to use ofloxacin and warm compress.     Pt is leaving for vacation tomorrow and would like reevaluation.     Pt scheduled for 05/29/2024 with

## 2024-05-28 NOTE — ED PROVIDER NOTES
Patient Seen in: [facility]      History     Chief Complaint   Patient presents with    Eye Visual Problem     Stated Complaint: right eye /stye  infection x  7 days    Subjective:   HPI  Sakshi Gao is 79 year old female presents with right lower eyelid redness x 7 days.  Seen by PCP where she was treated for periorbital cellulitis with Augmentin and ofloxacin eye drops.  Patient is concerned due to trace residual right lower eyelid swelling.  Patient denies  pain, visual loss, blurred vision, diplopia, injury, foreign body or foreign body sensation,   ocular entrapment, fevers, chills, headache, discharge, trauma. No medications taken prior to arrival.        Objective:   Past Medical History:    Allergic rhinitis    Anxiety    Atrophic vaginitis    Chronic pansinusitis    Corns and callosities    COVID-19    1/3/2022    Depression    Herpes zoster with other nervous system complications(053.19)    Hypothyroidism    acquired    Left bundle branch block (LBBB) on electrocardiogram    Left hand paresthesia    left middle, ring, 5th finger- tingling, no weakness or numbness. Resolved 2020. Cancel EMG appt 20.     Neoplasms of unspecified nature, other specified sites    Normal spontaneous vaginal delivery (HCC)    Osteoarthritis    Other malaise and fatigue    Other screening mammogram    Pap smear, as part of routine gynecological examination    Personal history of estrogen therapy    Seasonal allergies    Special screening for osteoporosis              Past Surgical History:   Procedure Laterality Date    Adenoidectomy      Cataract  2020    Colonoscopy  2003    Colonoscopy  3/12/13    Colonoscopy      Willa localization wire 1 site left (cpt=19281) Left     benign.    Willa localization wire 1 site right (cpt=19281) Right     benign.          Tonsillectomy                  Social History     Socioeconomic History    Marital status:    Tobacco Use     Smoking status: Former     Current packs/day: 0.00     Average packs/day: 0.3 packs/day for 2.0 years (0.6 ttl pk-yrs)     Types: Cigarettes     Start date: 1962     Quit date: 1964     Years since quittin.4     Passive exposure: Past    Smokeless tobacco: Never    Tobacco comments:     I was in college, everyone smoked.   Vaping Use    Vaping status: Never Used   Substance and Sexual Activity    Alcohol use: Yes     Comment: Occasional glass of wine    Drug use: Never   Other Topics Concern    Caffeine Concern No    Stress Concern Yes    Weight Concern No    Special Diet No    Exercise Yes    Seat Belt Yes              Review of Systems   All other systems reviewed and are negative.      Positive for stated complaint: right eye /stye  infection x  7 days  Other systems are as noted in HPI.  Constitutional and vital signs reviewed.      All other systems reviewed and negative except as noted above.    Physical Exam     ED Triage Vitals [24 1151]   /88   Pulse 70   Resp 20   Temp 98.4 °F (36.9 °C)   Temp src Temporal   SpO2 99 %   O2 Device None (Room air)       Current Vitals:   Vital Signs  BP: 159/88 (Talking & moving during b/p)  Pulse: 70  Resp: 20  Temp: 98.4 °F (36.9 °C)  Temp src: Temporal    Oxygen Therapy  SpO2: 99 %  O2 Device: None (Room air)            Physical Exam  Vitals and nursing note reviewed.   Constitutional:       General: She is not in acute distress.     Appearance: Normal appearance. She is normal weight. She is not ill-appearing, toxic-appearing or diaphoretic.   HENT:      Head: Normocephalic and atraumatic.      Right Ear: External ear normal.      Left Ear: External ear normal.      Nose: Nose normal.   Eyes:      General: No scleral icterus.        Right eye: No discharge.         Left eye: No discharge.      Extraocular Movements: Extraocular movements intact.      Conjunctiva/sclera: Conjunctivae normal.      Pupils: Pupils are equal, round, and reactive to  light.      Comments: External hordeolum noted to right lower eyelid    Pulmonary:      Effort: Pulmonary effort is normal. No respiratory distress.   Musculoskeletal:         General: No swelling, tenderness, deformity or signs of injury. Normal range of motion.      Cervical back: Normal range of motion and neck supple.   Skin:     General: Skin is warm and dry.      Capillary Refill: Capillary refill takes less than 2 seconds.      Coloration: Skin is not jaundiced or pale.      Findings: No bruising, erythema, lesion or rash.   Neurological:      General: No focal deficit present.      Mental Status: She is alert and oriented to person, place, and time. Mental status is at baseline.      Gait: Gait normal.   Psychiatric:         Mood and Affect: Mood normal.         Behavior: Behavior normal.               ED Course   Labs Reviewed - No data to display                   MDM                                        Medical Decision Making  79 year old well appearing female presents with residual external hordoleum of right lower eyelid.  Considerations include periorbital cellulitis vs hordeolum vs blepharitis   Plan   - encourage the use of eyelash scrubs and warm compresses with massage  - DC to home   - printed prescription for clindamycin 300 mg po TID to be started if symptoms do not improve in the next 48-72 hours.     - refer to ophthalmology/ Pediatrician         Disposition and Plan     Clinical Impression:  1. Hordeolum externum of right lower eyelid    2. Periorbital cellulitis of left eye         Disposition:  Discharge  5/28/2024 11:56 am    Follow-up:  Acacia Arndt DO  [address] [facility] [id number] [phone]          [facility]  [address]  [facility] [address]        [facility]  [address] [facility] [address] [phone]              Medications Prescribed:  Discharge Medication List as of 5/28/2024 11:57 AM        START  taking these medications    Details   clindamycin 300 MG Oral Cap Take 1 capsule (300 mg total) by mouth 3 (three) times daily for 7 days., Print, Disp-21 capsule, R-0

## 2024-05-28 NOTE — TELEPHONE ENCOUNTER
Patient is leaving for out of town and she said her stye still has not gone away can she get a refill on her medication from last time.

## 2024-06-20 ENCOUNTER — OFFICE VISIT (OUTPATIENT)
Dept: FAMILY MEDICINE CLINIC | Facility: CLINIC | Age: 79
End: 2024-06-20

## 2024-06-20 ENCOUNTER — TELEPHONE (OUTPATIENT)
Dept: FAMILY MEDICINE CLINIC | Facility: CLINIC | Age: 79
End: 2024-06-20

## 2024-06-20 VITALS
HEART RATE: 68 BPM | DIASTOLIC BLOOD PRESSURE: 70 MMHG | OXYGEN SATURATION: 99 % | WEIGHT: 135 LBS | BODY MASS INDEX: 23.92 KG/M2 | TEMPERATURE: 97 F | RESPIRATION RATE: 15 BRPM | HEIGHT: 63 IN | SYSTOLIC BLOOD PRESSURE: 126 MMHG

## 2024-06-20 DIAGNOSIS — M85.80 OSTEOPENIA WITH HIGH RISK OF FRACTURE: Primary | ICD-10-CM

## 2024-06-20 DIAGNOSIS — R14.0 ABDOMINAL BLOATING: Primary | ICD-10-CM

## 2024-06-20 DIAGNOSIS — Z86.69 HISTORY OF HORDEOLUM: ICD-10-CM

## 2024-06-20 DIAGNOSIS — M54.2 NECK PAIN ON RIGHT SIDE: ICD-10-CM

## 2024-06-20 DIAGNOSIS — E03.9 ACQUIRED HYPOTHYROIDISM: ICD-10-CM

## 2024-06-20 DIAGNOSIS — M85.80 OSTEOPENIA WITH HIGH RISK OF FRACTURE: ICD-10-CM

## 2024-06-20 DIAGNOSIS — Z12.31 VISIT FOR SCREENING MAMMOGRAM: ICD-10-CM

## 2024-06-20 DIAGNOSIS — R20.2 TINGLING OF BOTH FEET: ICD-10-CM

## 2024-06-20 PROCEDURE — 96372 THER/PROPH/DIAG INJ SC/IM: CPT | Performed by: FAMILY MEDICINE

## 2024-06-20 PROCEDURE — 99214 OFFICE O/P EST MOD 30 MIN: CPT | Performed by: FAMILY MEDICINE

## 2024-06-20 RX ORDER — LEVOTHYROXINE SODIUM 0.1 MG/1
100 TABLET ORAL DAILY
Qty: 90 TABLET | Refills: 3 | Status: SHIPPED | OUTPATIENT
Start: 2024-06-20

## 2024-06-20 NOTE — TELEPHONE ENCOUNTER
Please order Prolia shot for December 20th 2024.    Patient will complete BMP within 30 days of the injection.  Orders been placed.

## 2024-06-20 NOTE — PROGRESS NOTES
Patient is present today to received Prolia injection, injection given in right arm subcutaneous, patient tolerated well      Next injection due 12/20/2024

## 2024-06-20 NOTE — PROGRESS NOTES
CHIEF COMPLAINT:   Chief Complaint   Patient presents with    Medication Follow-Up         HPI:     Sakshi Gao is a 79 year old female presents for  osteoporosis on prolia- needs injection today.     Pt had a history of hypothyroidism and here to recheck. Has been tolerating the medication well. Last TSH -   TSH (mIU/L)   Date Value   06/13/2023 0.78   04/21/2022 1.86   06/13/2020 1.29    Denies any shakiness, palpitations, increased BM's or wgt loss. Denies any fatigue, wgt gain.    Right neck pain- improved. Denies radiation of pain to arms. Having some tingling in feet. Denies weakness in arms or legs, difficulty walking.  Using diclofenac cream occasionally with relief.  Has appt with neurologist for tingling in feet on 6/25/2024 to evaluate.     Osteopenia- due for prolia now. Leg pains only if sits in recliner and elevates legs with foot rest. If sits without foot rest then no leg pains. No difficulty walking. On calcium and vit D    Hordeolum-right eye-patient was prescribed Augmentin and Ocuflox.  After a few days her eye symptoms were  improving patient was concerned should have improved more than had-went to the immediate care was evaluated.  Patient was prescribed clindamycin 300 mg 3 times daily for 7 days and told if not improving in a few days then can start medication.  Patient developed soft stool but denies diarrhea or abdominal pain.  Patient started taking probiotics at the recommendation of the pharmacist and started clindamycin and states the heartily him is completely gone but there is a small area on the inside of the eye has a whitish area was concerned.  Denies any vision changes.    Abdominal bloating-states with 2 courses of antibiotics would like to take the probiotics and observe and if symptoms worsen or change or persist, she will schedule an office visit in a couple of months.  She is denying any abdominal pain, blood in the stool, black stool or diarrhea.      Partial HPI  5/2024  Osteopenia with high fall risk.  On Prolia x 1 year.  Patient is concerned about potential side effects.  Developed tingling in the bottoms of her feet over the past several months.  Has had some pains and aches in her legs and hips.  Denies any joint swelling or hand pain.  Concerned how Prolia works.  Uncertain if she should continue taking.  Was previously on alendronate then developed side effects-achiness in her bones and and digestive issues-stomach uneasiness and increased burping after ingestion.  Denies dysphagia, melena and epigastric pain.  Previously tried on Boniva monthly but side effects were worse.  Could not tolerate.  Denies any falls.  Denies fractures.  States balance is normal.   Last DEXA was 12/11/2023 with osteopenia of femoral neck with T-score -1.3 with a 6.3% change from prior examination, total hip T-score -0.4 and lumbar T score 1.1.  FRAX score  12% for 10-year risk and decreased risk of fracture with hip at 2.8%.    Bunion of great toe-managing.  Not ready to see podiatrist.  States does yoga and stretching exercises of the toe and helps the pain.    Primary osteoarthritis of both feet-states doing well rarely takes Tylenol.    Complains of right-sided neck pain radiating to her shoulder for 1 year.  Using Biofreeze but developed a rash.  She exercises 3 times a week in an weightbearing class, performs yoga 2-3 times a week and then walks at least 30 minutes several times a week.  States cannot sleep on her right side or it aggravates the pain.  Right neck and right upper extremity pain or not constant.  Can be triggered with abduction of the arm.  No soreness with rotation flexion or extension of the neck.  If she avoids her right side she will sleep well.  She is uncertain if she had any precipitating incident.  Denies any numbness or tingling or weakness of the upper extremities.  Has only tingling in both of her fee for several months.   denies any change in grasp strength.   Right-hand-dominant.  Still very active.  [employer] has a summer home in [place].    Abdominal bloating x 1 year.  Denies abdominal pain.  Denies fever chills or loss of appetite.  Uncertain what is causing it.  Denies any nausea or vomiting.  Tends to occur with intermittent constipation.  Most days goes daily but occasionally can skip a couple of days is concerned about what is triggering it.  When not constipated stools are normal size and caliber.  Brown stools.  No change in the color.  Denies any change of appetite or nausea or vomiting.  Does not track water intake.  States tries to drink water often.  Had some recent hot flashes.      HISTORY:  Past Medical History:    Allergic rhinitis    Anxiety    Atrophic vaginitis    Chronic pansinusitis    Corns and callosities    COVID-19    1/3/2022    Depression    Herpes zoster with other nervous system complications(053.19)    Hypothyroidism    acquired    Left bundle branch block (LBBB) on electrocardiogram    Left hand paresthesia    left middle, ring, 5th finger- tingling, no weakness or numbness. Resolved 2020. Cancel EMG appt 20.     Neoplasms of unspecified nature, other specified sites    Normal spontaneous vaginal delivery (HCC)    Osteoarthritis    Other malaise and fatigue    Other screening mammogram    Pap smear, as part of routine gynecological examination    Personal history of estrogen therapy    Seasonal allergies    Special screening for osteoporosis      Past Surgical History:   Procedure Laterality Date    Adenoidectomy      Cataract  2020    Colonoscopy  2003    Colonoscopy  3/12/13    Colonoscopy      Willa localization wire 1 site left (cpt=19281) Left     benign.    Willa localization wire 1 site right (cpt=19281) Right     benign.          Tonsillectomy        Family History   Problem Relation Age of Onset    Dementia Father     Diabetes Father         mellitus    Hypertension Father     Hypertension  Mother     Breast Cancer Mother 93        brca at age 92, passed away at age 96.  possible trauma as cause.    Other (Other) Mother     Diabetes Maternal Grandmother     Diabetes Paternal Grandmother     Cancer Brother 68        melanoma.     Diabetes Maternal Uncle       Social History:   Social History     Socioeconomic History    Marital status:    Tobacco Use    Smoking status: Former     Current packs/day: 0.00     Average packs/day: 0.3 packs/day for 2.0 years (0.6 ttl pk-yrs)     Types: Cigarettes     Start date: 1962     Quit date: 1964     Years since quittin.5     Passive exposure: Past    Smokeless tobacco: Never    Tobacco comments:     I was in college, everyone smoked.   Vaping Use    Vaping status: Never Used   Substance and Sexual Activity    Alcohol use: Yes     Comment: Occasional glass of wine    Drug use: Never   Other Topics Concern    Caffeine Concern No    Stress Concern Yes    Weight Concern No    Special Diet No    Exercise Yes    Seat Belt Yes        Medications (Active prior to today's visit):  Current Outpatient Medications   Medication Sig Dispense Refill    ofloxacin 0.3 % Ophthalmic Solution 1 gtt to affected eye(s) every 2 hours x 2 days then 1 gtt affected eye(s) every 6hours x 5 days- total is 7 days. 10 mL 0    levothyroxine 100 MCG Oral Tab TAKE 1 TABLET DAILY 90 tablet 0    diclofenac 1 % External Gel Apply 2 g topically 4 (four) times daily as needed. Buy otc 150 g 3    hydrocortisone 2.5 % External Cream Apply to Affected Area of face twice daily when flared, then as needed.      ketoconazole 2 % External Shampoo Apply to scalp 3-4 times a week as needed. Let sit 2-3 minutes then rinse. Use regular shampoo and conditioner after.      Acetaminophen ER (TYLENOL 8 HOUR ARTHRITIS PAIN) 650 MG Oral Tab CR Take 2 tablets (1,300 mg total) by mouth every 8 (eight) hours as needed for Pain (Max is 6 tablets in 24 hours or 4000 mg of Tylenol/acetaminophen in 24  hours). Buy over the counter 180 tablet 0    Calcium Carbonate 1500 (600 Ca) MG Oral Tab Take 600 mg by mouth.      Melatonin 5 MG Oral Tab 1/2 tab po nightly as needed 30 tablet 0    Cholecalciferol (VITAMIN D) 2000 units Oral Cap Take 1 capsule (2,000 Units total) by mouth daily. Adjusted on 6/3/2019 from 4000IU due to vit D level 70. 30 capsule 0    cetirizine 10 MG Oral Tab Take 1 tablet (10 mg total) by mouth daily.      Multiple Vitamins-Minerals (EYE VITAMINS OR) Take  by mouth.      Multiple Vitamin (MULTI-VITAMIN OR) Take  by mouth.         Allergies:  Allergies   Allergen Reactions    Latex OTHER (SEE COMMENTS)    Sulfa Antibiotics HIVES    Sulfa Drugs Cross Reactors HIVES    Latex      Smell lingers, bothersome       PSFH elements reviewed from today and agreed except as otherwise stated in HPI.  PHYSICAL EXAM:   /70 (BP Location: Left arm, Patient Position: Sitting, Cuff Size: adult)   Pulse 68   Temp 97.2 °F (36.2 °C) (Temporal)   Resp 15   Ht 5' 3\" (1.6 m)   Wt 135 lb (61.2 kg)   LMP 10/25/1998 (LMP Unknown)   SpO2 99%   BMI 23.91 kg/m²   Vital signs reviewed.Appears stated age, well groomed.  Physical Exam   General: Well-nourished, well hydrated. No acute distress. No pallor. No tachypnea. Non toxic.  HEENT: normocephaly, atraumatic.  Sclera clear and non icteric bilaterally.  Right eye-sclera is clear, normal upper or lower eyelids.  Right mid subconjunctival area is a 2 mm whitish small cyst.  Oral pharynx clear without normal finding.  Moist mucous membranes.  Neck: supple. No adenopathy. No thyromegaly.  Neck mild muscle spasms bilateral very tight.  And ROM.  Negative Lhermitte sign.  Heart: RRR without S3 or S4 murmur . Clear S1S2  Lungs: clear to auscultation bilaterally. No rales, rhonchi or wheezes. Good inspiratory and expiratory effort  Abdomen: soft, nontender, nondistended. NL bowel sounds.  No hepatosplenomegaly  Extremities: No cyanosis, clubbing, or edema bilaterally.     Skin: no acute rashes  Neuro: AOx3.  Normal gait      LABS     Office Visit on 05/13/2024   Component Date Value    GLUCOSE 05/16/2024 88     UREA NITROGEN (BUN) 05/16/2024 15     CREATININE 05/16/2024 0.67     EGFR 05/16/2024 88     BUN/CREATININE RATIO 05/16/2024 SEE NOTE:     SODIUM 05/16/2024 140     POTASSIUM 05/16/2024 4.1     CHLORIDE 05/16/2024 104     CARBON DIOXIDE 05/16/2024 28     CALCIUM 05/16/2024 9.5     PROTEIN, TOTAL 05/16/2024 6.6     ALBUMIN 05/16/2024 4.4     GLOBULIN 05/16/2024 2.2     ALBUMIN/GLOBULIN RATIO 05/16/2024 2.0     BILIRUBIN, TOTAL 05/16/2024 1.0     ALKALINE PHOSPHATASE 05/16/2024 45     AST 05/16/2024 17     ALT 05/16/2024 14     TSH W/REFLEX TO FT4 05/16/2024 1.79     WHITE BLOOD CELL COUNT 05/16/2024 4.7     RED BLOOD CELL COUNT 05/16/2024 4.44     HEMOGLOBIN 05/16/2024 12.8     HEMATOCRIT 05/16/2024 40.3     MCV 05/16/2024 90.8     MCH 05/16/2024 28.8     MCHC 05/16/2024 31.8 (L)     RDW 05/16/2024 12.0     PLATELET COUNT 05/16/2024 235     MPV 05/16/2024 10.2     ABSOLUTE NEUTROPHILS 05/16/2024 2,491     ABSOLUTE LYMPHOCYTES 05/16/2024 1,687     ABSOLUTE MONOCYTES 05/16/2024 376     ABSOLUTE EOSINOPHILS 05/16/2024 108     ABSOLUTE BASOPHILS 05/16/2024 38     NEUTROPHILS 05/16/2024 53     LYMPHOCYTES 05/16/2024 35.9     MONOCYTES 05/16/2024 8.0     EOSINOPHILS 05/16/2024 2.3     BASOPHILS 05/16/2024 0.8     VITAMIN B12 05/16/2024 552         Narrative   PROCEDURE:  XR CERVICAL SPINE (4VIEWS) (CPT=72050)     TECHNIQUE:  AP, lateral, obliques, and coned down view of the spine were obtained.     COMPARISON:  [facility], XR CERVICAL SPINE (4VIEWS) (CPT=72050), 3/07/2023, 1:38 PM.     INDICATIONS:  M54.2 Neck pain on right side     PATIENT STATED HISTORY: (As transcribed by Technologist)  Chronic right side of cervical pain.         FINDINGS:      Cervical vertebral alignment demonstrates minimal anterolisthesis C3 on C4...  No acute fractures or osseous lesions are identified.  No  prevertebral soft tissue swelling.  Multilevel degenerative changes with uncovertebral and facet hypertrophy.                    Impression   CONCLUSION:    1. No acute fractures.  2. Multilevel degenerative changes.          LOCATION:  [facility]        Dictated by (CST): Real Cotton MD on 5/16/2024 at 12:14 PM      Finalized by (MARIYA): Real Cotton MD on 5/16/2024 at 12:15 PM         REVIEWED THIS VISIT  ASSESSMENT/PLAN:   79 year old female with    1. Abdominal bloating  Continue probiotic for at least the next 3 months since completed a course of clindamycin which increases risk of C. Difficile  Keep diary of symptoms and when notices-if symptoms persist, change or worsen then needs to be reevaluated ASAP.  Patient understands plan and follow-up    2. Neck pain on right side  Mild OA on x-ray  No radicular symptoms  States it has more of an ache than pain  Declined physical therapy   continue diclofenac as needed    3. Osteopenia with high risk of fracture  - denosumab (Prolia) 60 MG/ML SUBQ injection 60 mg  - BASIC METABOLIC PANEL [85554] [Q]; Future  - BASIC METABOLIC PANEL [11358] [Q]  Prolia injection today  Will reorder for 6 months    4. Acquired hypothyroidism  - levothyroxine 100 MCG Oral Tab; Take 1 tablet (100 mcg total) by mouth daily.  Dispense: 90 tablet; Refill: 3  Euthymic  Normal TSH  Continue levothyroxine  Screen annually    5. History of hordeolum  Resolved but may have new hordeolum forming  Continue warm compresses 3 times a day  Finish Ocuflox  Try to avoid oral antibiotics since had 2 rounds of recent antibiotics including clinda increases risk of C. difficile    6. Tingling of both feet  Normal B12  Neck pain is nonradiating.  Mild degenerative changes on x-ray    7. Visit for screening mammogram  - [facility] FERNANDO 2D+3D SCREENING BILAT (CPT=77067/55845); Future      Meds This Visit:  Requested Prescriptions     Signed Prescriptions Disp Refills    levothyroxine 100 MCG Oral Tab 90 tablet 3      Sig: Take 1 tablet (100 mcg total) by mouth daily.       Health Maintenance:  Health Maintenance   Topic Date Due    COVID-19 Vaccine (7 - 2023-24 season) 03/07/2024    Influenza Vaccine  Completed    DEXA Scan  Completed    MA Annual Health Assessment  Completed    Annual Depression Screening  Completed    Fall Risk Screening (Annual)  Completed    Pneumococcal Vaccine: 65+ Years  Completed    Zoster Vaccines  Completed         Patient/Caregiver Education: Patient/Caregiver Education: There are no barriers to learning. Medical education done.   Outcome: Patient verbalizes understanding. Patient is notified to call with any questions, comp lications, allergies, or worsening or changing symptoms.  Patient is to call with any side effects or complications from the treatments as a result of today.     Problem List:     Patient Active Problem List   Diagnosis    Hypothyroidism    History of vitamin D deficiency    Vaginal atrophy    Bunion of great toe    Primary osteoarthritis of both hands    Osteopenia with high risk of fracture    DNR (do not resuscitate)    Slow transit constipation    Abdominal bloating    Neck pain on right side    Tingling of both feet    Periorbital cellulitis of right eye    Hordeolum externum of right lower eyelid       Imaging & Referrals:  None     6/20/2024  Aaccia Arndt DO      Patient understands plan and follow-up.  Return in about 6 months (around 12/20/2024) for prolia shot.

## 2024-06-25 ENCOUNTER — OFFICE VISIT (OUTPATIENT)
Dept: NEUROLOGY | Facility: CLINIC | Age: 79
End: 2024-06-25

## 2024-06-25 VITALS
SYSTOLIC BLOOD PRESSURE: 134 MMHG | RESPIRATION RATE: 16 BRPM | HEIGHT: 63 IN | HEART RATE: 71 BPM | WEIGHT: 135 LBS | BODY MASS INDEX: 23.92 KG/M2 | DIASTOLIC BLOOD PRESSURE: 74 MMHG

## 2024-06-25 DIAGNOSIS — R20.0 NUMBNESS AND TINGLING OF BOTH FEET: Primary | ICD-10-CM

## 2024-06-25 DIAGNOSIS — R20.2 NUMBNESS AND TINGLING OF BOTH FEET: Primary | ICD-10-CM

## 2024-06-25 PROCEDURE — 99204 OFFICE O/P NEW MOD 45 MIN: CPT | Performed by: OTHER

## 2024-06-25 NOTE — PATIENT INSTRUCTIONS
Refill policies:    Allow 2-3 business days for refills; controlled substances may take longer.  Contact your pharmacy at least 5 days prior to running out of medication and have them send an electronic request or submit request through the “request refill” option in your [employer] account.  Refills are not addressed on weekends; covering physicians do not authorize routine medications on weekends.  No narcotics or controlled substances are refilled after noon on Fridays or by on call physicians.  By law, narcotics must be electronically prescribed.  A 30 day supply with no refills is the maximum allowed.  If your prescription is due for a refill, you may be due for a follow up appointment.  To best provide you care, patients receiving routine medications need to be seen at least once a year.  Patients receiving narcotic/controlled substance medications need to be seen at least once every 3 months.  In the event that your preferred pharmacy does not have the requested medication in stock (e.g. Backordered), it is your responsibility to find another pharmacy that has the requested medication available.  We will gladly send a new prescription to that pharmacy at your request.    Scheduling Tests:    If your physician has ordered radiology tests such as MRI or CT scans, please contact Central Scheduling at [phone] right away to schedule the test.  Once scheduled, the [facility] Centralized Referral Team will work with your insurance carrier to obtain pre-certification or prior authorization.  Depending on your insurance carrier, approval may take 3-10 days.  It is highly recommended patients assure they have received an authorization before having a test performed.  If test is done without insurance authorization, patient may be responsible for the entire amount billed.      Precertification and Prior Authorizations:  If your physician has recommended that you have a procedure or additional testing performed the [facility]  Centralized Referral Team will contact your insurance carrier to obtain pre-certification or prior authorization.    You are strongly encouraged to contact your insurance carrier to verify that your procedure/test has been approved and is a COVERED benefit.  Although the [facility] Centralized Referral Team does its due diligence, the insurance carrier gives the disclaimer that \"Although the procedure is authorized, this does not guarantee payment.\"    Ultimately the patient is responsible for payment.   Thank you for your understanding in this matter.  Paperwork Completion:  If you require FMLA or disability paperwork for your recovery, please make sure to either drop it off or have it faxed to our office at [phone]. Be sure the form has your name and date of birth on it.  The form will be faxed to our Forms Department and they will complete it for you.  There is a 25$ fee for all forms that need to be filled out.  Please be aware there is a 10-14 day turnaround time.  You will need to sign a release of information (IVORY) form if your paperwork does not come with one.  You may call the Forms Department with any questions at [phone].  Their fax number is [phone].

## 2024-06-25 NOTE — H&P
[facility] New Patient / Consult Visit    Sakshi Gao is a 79 year old female.                         Referring MD: No ref. provider found    Chief Complaint   Patient presents with    Neurologic Problem     C/O of  right side neck pain,tingling on both feet. Patient states that on Saturday she was struck by a wooden frame in the left lower head and the last two days has had brain fog       HPI:    Sakshi Gao is a 79 year old, with PMHx including but not limited to osteopenia and hypothyroidism, who presents for evaluation of numbness and tingling in feet.  Patient states she noted gradual worsening of tingling in feet but worsening in the past year since being on Prolia for osteoporosis. She was previously feeling some numbness in feet but tingling in the past year. She denies tingling in hands and denies falls or significant balance issues or tripping over feet. She is on calcium supplement.  She was on gabapentin briefly when she had an episode of tingling / pain in the right neck and arm during pandemic but admits this resolved on own and was not able to tolerate gabapentin as she was drowsy on the medication.     Of note, she also has some neck pain.      Father had spinal stenosis.        Otherwise, patient denies any recent weight change, fevers, chills, nausea, double vision/ blurry vision / loss of vision, chest pain, palpitations, shortness of breath, rashes, joint pains, bowel / bladder incontinence or mood issues.     Past Medical History:    Allergic rhinitis    Anxiety    Atrophic vaginitis    Chronic pansinusitis    Corns and callosities    COVID-19    1/3/2022    Depression    Herpes zoster with other nervous system complications(053.19)    Hypothyroidism    acquired    Left bundle branch block (LBBB) on electrocardiogram    Left hand paresthesia    left middle, ring, 5th finger- tingling, no weakness or numbness. Resolved 6/8/2020. Cancel EMG appt 6/22/20.      Neoplasms of unspecified nature, other specified sites    Normal spontaneous vaginal delivery (HCC)    Osteoarthritis    Other malaise and fatigue    Other screening mammogram    Pap smear, as part of routine gynecological examination    Personal history of estrogen therapy    Seasonal allergies    Special screening for osteoporosis     Past Surgical History:   Procedure Laterality Date    Adenoidectomy      Cataract  2020    Colonoscopy  2003    Colonoscopy  3/12/13    Colonoscopy      Willa localization wire 1 site left (cpt=19281) Left     benign.    Willa localization wire 1 site right (cpt=19281) Right     benign.          Tonsillectomy       Social History     Socioeconomic History    Marital status:    Tobacco Use    Smoking status: Former     Current packs/day: 0.00     Average packs/day: 0.3 packs/day for 2.0 years (0.6 ttl pk-yrs)     Types: Cigarettes     Start date: 1962     Quit date: 1964     Years since quittin.5     Passive exposure: Never    Smokeless tobacco: Never    Tobacco comments:     I was in college, everyone smoked.   Vaping Use    Vaping status: Never Used   Substance and Sexual Activity    Alcohol use: Yes     Comment: Occasional glass of wine    Drug use: Never   Other Topics Concern    Caffeine Concern Yes     Comment: 1 cup    Stress Concern Yes    Weight Concern No    Special Diet No    Exercise Yes     Comment: yoga,walk,exercise class    Seat Belt Yes     Family History   Problem Relation Age of Onset    Dementia Father     Diabetes Father         mellitus    Hypertension Father     Hypertension Mother     Breast Cancer Mother 93        brca at age 92, passed away at age 96.  possible trauma as cause.    Other (Other) Mother     Diabetes Maternal Grandmother     Diabetes Paternal Grandmother     Cancer Brother 68        melanoma.     Diabetes Maternal Uncle        Allergies:  Allergies   Allergen Reactions    Latex OTHER (SEE COMMENTS)    Sulfa  Antibiotics HIVES    Sulfa Drugs Cross Reactors HIVES    Latex      Smell lingers, bothersome      Current Meds:  Current Outpatient Medications   Medication Sig Dispense Refill    levothyroxine 100 MCG Oral Tab Take 1 tablet (100 mcg total) by mouth daily. 90 tablet 3    ofloxacin 0.3 % Ophthalmic Solution 1 gtt to affected eye(s) every 2 hours x 2 days then 1 gtt affected eye(s) every 6hours x 5 days- total is 7 days. 10 mL 0    diclofenac 1 % External Gel Apply 2 g topically 4 (four) times daily as needed. Buy otc 150 g 3    hydrocortisone 2.5 % External Cream Apply to Affected Area of face twice daily when flared, then as needed.      ketoconazole 2 % External Shampoo Apply to scalp 3-4 times a week as needed. Let sit 2-3 minutes then rinse. Use regular shampoo and conditioner after.      Acetaminophen ER (TYLENOL 8 HOUR ARTHRITIS PAIN) 650 MG Oral Tab CR Take 2 tablets (1,300 mg total) by mouth every 8 (eight) hours as needed for Pain (Max is 6 tablets in 24 hours or 4000 mg of Tylenol/acetaminophen in 24 hours). Buy over the counter 180 tablet 0    Calcium Carbonate 1500 (600 Ca) MG Oral Tab Take 600 mg by mouth.      Melatonin 5 MG Oral Tab 1/2 tab po nightly as needed 30 tablet 0    Cholecalciferol (VITAMIN D) 2000 units Oral Cap Take 1 capsule (2,000 Units total) by mouth daily. Adjusted on 6/3/2019 from 4000IU due to vit D level 70. 30 capsule 0    cetirizine 10 MG Oral Tab Take 1 tablet (10 mg total) by mouth daily.      Multiple Vitamins-Minerals (EYE VITAMINS OR) Take  by mouth.      Multiple Vitamin (MULTI-VITAMIN OR) Take  by mouth.            ROS:   A comprehensive 10 point review of systems was completed.  Pertinent positives and negatives noted in the the HPI.      PHYSICAL EXAM:   /74 (BP Location: Left arm, Patient Position: Sitting, Cuff Size: adult)   Pulse 71   Resp 16   Ht 63\"   Wt 135 lb (61.2 kg)   LMP 10/25/1998 (LMP Unknown)   BMI 23.91 kg/m²   Estimated body mass index is  23.91 kg/m² as calculated from the following:    Height as of this encounter: 63\".    Weight as of this encounter: 135 lb (61.2 kg).    GENERAL: well developed, well nourished, in no apparent distress  SKIN: no rashes  EYES: sclera anicteric, conjunctiva normal  HEENT: normocephalic  CARDIOVASCULAR: S1, S2 normal, RRR  LUNGS: clear to auscultation bilaterally  EXTREMITIES: no cyanosis, peripheral pulses intact    Neck: Supple; full range of motion; no carotid bruits    Mental status:  Alert and oriented to time, place, person, and situation  Speech: fluent  Language: normal naming, repetition, and comprehension  Memory: normal  Attention/concentration: normal    Fundoscopic Exam: optic discs sharp bilaterally    Cranial Nerves: II through XII  Optic:    Pupils: equally round and reactive to light with direct and consensual responses, normal accomodation   Visual acuity: Normal              Visual fields: Normal  Oculomotor/Trochlear/Abducens:    Eye Movements: EOMI without nystagmus  Trigeminal:   Facial sensation:intact to light touch bilaterally  Facial:   Smile symmetric, eyebrow raise symmetric  Vestibulocochlear:   Hearing: normal bilaterally  Glossopharyngeal/Vagus:   Palate elevates symmetrically with midline uvula  Spinal accessory:   Shoulder Shrug: normal bilaterally   Lateral head turn: normal bilaterally  Hypoglossal:   Tongue movement: protrusion is midline with normal lateral movements    Motor System:  Strength: 5/5 throughout  Tone: normal    Sensory:  Pin is normal  Vibration is reduced in distal LE  Proprioception is normal  Romberg is absent    Coordination:  Finger to nose normal bilaterally  Rapid alternating movements normal bilaterally  Heel to shin is normal bilaterally    DTRs:  2+ UE bilaterally,  1+ patellar, R slightly less than L; trace ankle jerks bilaterally; no clonus;           Gait:  Normal casual, heel, toe and tandem gait    TEST RESULTS/DATA REVIEWED:     Labs  Reviewed in  EMR    Component      Latest Ref Rng 5/16/2024   WBC      3.8 - 10.8 Thousand/uL 4.7    RBC      3.80 - 5.10 Million/uL 4.44    Hemoglobin      11.7 - 15.5 g/dL 12.8    Hematocrit      35.0 - 45.0 % 40.3    MCV      80.0 - 100.0 fL 90.8    MCH      27.0 - 33.0 pg 28.8    MCHC      32.0 - 36.0 g/dL 31.8 (L)    RDW      11.0 - 15.0 % 12.0    Platelet Count      140 - 400 Thousand/uL 235    MPV      7.5 - 12.5 fL 10.2    Neutrophils Absolute      1,500 - 7,800 cells/uL 2,491    Lymphocytes Absolute      850 - 3,900 cells/uL 1,687    Monocytes Absolute      200 - 950 cells/uL 376    Eosinophils Absolute      15 - 500 cells/uL 108    Basophils Absolute      0 - 200 cells/uL 38    Neutrophils %      % 53    Lymphocytes %      % 35.9    Monocytes %      % 8.0    Eosinophils %      % 2.3    Basophils %      % 0.8    Glucose      65 - 99 mg/dL 88    BUN      7 - 25 mg/dL 15    CREATININE      0.60 - 0.95 mg/dL 0.67    EGFR      > OR = 60 mL/min/1.73m2 88    BUN/CREATININE RATIO      6 - 22 (calc) SEE NOTE:    Sodium      135 - 146 mmol/L 140    Potassium      3.5 - 5.3 mmol/L 4.1    Chloride      98 - 110 mmol/L 104    Carbon Dioxide, Total      20 - 32 mmol/L 28    CALCIUM      8.6 - 10.4 mg/dL 9.5    PROTEIN, TOTAL      6.1 - 8.1 g/dL 6.6    Albumin      3.6 - 5.1 g/dL 4.4    Globulin      1.9 - 3.7 g/dL (calc) 2.2    A/G Ratio      1.0 - 2.5 (calc) 2.0    Total Bilirubin      0.2 - 1.2 mg/dL 1.0    Alkaline Phosphatase      37 - 153 U/L 45    AST (SGOT)      10 - 35 U/L 17    ALT (SGPT)      6 - 29 U/L 14    TSH      0.40 - 4.50 mIU/L 1.79    Vitamin B12      200 - 1,100 pg/mL 552         Imaging:     Xray cervical spine (5/16/2024) :     FINDINGS:      Cervical vertebral alignment demonstrates minimal anterolisthesis C3 on C4...  No acute fractures or osseous lesions are identified.  No prevertebral soft tissue swelling.  Multilevel degenerative changes with uncovertebral and facet hypertrophy.       IMPRESSION AND PLAN:    Sakshi Gao is a 79 year old female with with PMHx including but not limited to osteopenia and hypothyroidism, who presents for evaluation of numbness and tingling in feet.  Patient states she noted gradual worsening of tingling in feet but worsening in the past year since being on Prolia for osteoporosis. She was previously feeling some numbness in feet but tingling in the past year. She denies tingling in hands and denies falls or significant balance issues or tripping over feet. She is on calcium supplement.  She was on gabapentin briefly int the past but was not able to tolerate gabapentin as she was drowsy on the medication.       Symptoms overall suggestive of neuropathy and will plan to evaluate further with NCS/EMG.     1. Numbness and tingling of both feet  As noted above   - EMG (ELSIE BARR); Future    Copy of note was sent to referring physician.      No follow-ups on file.    Cameron Jovel MD, Neurology  [facility]  Pager [phone]  6/25/2024

## 2024-07-09 ENCOUNTER — TELEPHONE (OUTPATIENT)
Dept: FAMILY MEDICINE CLINIC | Facility: CLINIC | Age: 79
End: 2024-07-09

## 2024-07-09 DIAGNOSIS — Z86.69 HISTORY OF HORDEOLUM: Primary | ICD-10-CM

## 2024-07-09 DIAGNOSIS — H00.012 HORDEOLUM EXTERNUM OF RIGHT LOWER EYELID: ICD-10-CM

## 2024-07-09 RX ORDER — OFLOXACIN 3 MG/ML
2 SOLUTION/ DROPS OPHTHALMIC EVERY 4 HOURS
Qty: 5 ML | Refills: 0 | Status: CANCELLED | OUTPATIENT
Start: 2024-07-09

## 2024-07-09 NOTE — TELEPHONE ENCOUNTER
Patient needs to see ophthalmologist.  Recommend Dr. Agarawl if patient does not have an ophthalmologist.

## 2024-07-09 NOTE — TELEPHONE ENCOUNTER
Medication and dose:  Eye drops does not know name or dosage    30 or 90 day supply:  30    Pharmacy: [facility] on [address]    Additional notes:  Pt states dr wants her to continue eye drops for stye but she left them Im her [facility] in [place].    Your medication request will be sent to our clinical team.  If they have any questions they will call you. Patient expressed understanding.

## 2024-07-09 NOTE — TELEPHONE ENCOUNTER
Last time patient was given eye drops was 05/20/2024   Ofloxacin for 7 days.   Antibiotic eye drops are typically  not intended for long term use.   Eye is tills red around water line, will sometimes have a \"bubble stye\". Pt states at her last OV  06/20/2024 she was advised to finish her Ofloxacin. Patient was in [place] over the weekend and forgot her prescription there.     Patient wants to know if  recommends having patient continue drops, if so would like new Rx. Since not resolved should patient follow up with Ophthalmologist?     If yes, she has had cataract surgery in the past with Dr.Michelle Bradford from [facility].     Orders pended if needed, sent to PCP for review and signature

## 2024-07-11 NOTE — TELEPHONE ENCOUNTER
Pt states that she is going in today to [facility] with dr Dillon who she has seen before.

## 2024-07-11 NOTE — TELEPHONE ENCOUNTER
Pt called office stating that she has not heard back from anyone. Pt sounded a little overwhelmed due to the issue getting worse. I gave pt drs msg and she stated that she will call them.

## 2024-07-11 NOTE — TELEPHONE ENCOUNTER
Left message on voicemail asking patient to call office or send Mychart  to confirm if she was able to schedule appointment with Dr. Bradford.

## 2024-08-08 ENCOUNTER — PROCEDURE VISIT (OUTPATIENT)
Dept: NEUROLOGY | Facility: CLINIC | Age: 79
End: 2024-08-08
Payer: MEDICARE

## 2024-08-08 DIAGNOSIS — R20.2 NUMBNESS AND TINGLING OF BOTH FEET: ICD-10-CM

## 2024-08-08 DIAGNOSIS — E03.9 ACQUIRED HYPOTHYROIDISM: ICD-10-CM

## 2024-08-08 DIAGNOSIS — R20.0 NUMBNESS AND TINGLING OF BOTH FEET: ICD-10-CM

## 2024-08-08 PROCEDURE — 95886 MUSC TEST DONE W/N TEST COMP: CPT | Performed by: OTHER

## 2024-08-08 PROCEDURE — 95909 NRV CNDJ TST 5-6 STUDIES: CPT | Performed by: OTHER

## 2024-08-09 RX ORDER — LEVOTHYROXINE SODIUM 0.1 MG/1
100 TABLET ORAL DAILY
Qty: 90 TABLET | Refills: 3 | Status: SHIPPED | OUTPATIENT
Start: 2024-08-09

## 2024-08-09 NOTE — TELEPHONE ENCOUNTER
Refill Passed Protocol:     Pt requesting refill of   Requested Prescriptions     Pending Prescriptions Disp Refills    LEVOTHYROXINE 100 MCG Oral Tab [Pharmacy Med Name: L-THYROXINE (SYNTHROID) TABS 100MCG] 90 tablet 3     Sig: TAKE 1 TABLET DAILY      Refill was approved and sent to pharmacy:   Thyroid Medication Protocol Cgcmcr2008/08/2024 02:04 PM   Protocol Details TSH in past 12 months    Last TSH value is normal    In person appointment or virtual visit in the past 12 mos or appointment in next 3 mos     Last Time Medication was Filled:  6/20/2024 90 days 3 refills    Last Office Visit with Provider: 6/20/2024    Appt. scheduled on 12/20/2024

## 2024-08-09 NOTE — PROCEDURES
[facility]  [address] [place] [address]   [phone]        Full Name: AMY POSEY Gender: Female  Patient ID: [id number] YOB: 1945      Visit Date: 8/7/2024 4:08 PM  Age: 79 Years  Examining Physician: EDUAR JAMISON MD  Height: 5 feet 3 inch  Weight: 135 lbs  History:     Focused HPI:   This is a 79 year old female with PMHx including but not limited to osteopenia and hypothyroidism, who presents for evaluation of numbness and tingling in feet.  Patient states she noted gradual worsening of tingling in feet but worsening in the past year since being on Prolia for osteoporosis. She was previously feeling some numbness in feet but tingling in the past year. She denies tingling in hands and denies falls or significant balance issues or tripping over feet. She is on calcium supplement.  She was on gabapentin briefly in the past but was not able to tolerate gabapentin as she was drowsy on the medication.        Symptoms overall suggestive of neuropathy and will plan to evaluate further with NCS/EMG.     Focused Exam:  Motor: 5/5 strength throughout  DTRs: 2+ UE bilaterally,  1+ patellar, R slightly less than L; trace ankle jerks bilaterally; no clonus;   Sensory: pin is normal; vibration reduced distal LE        Sensory NCS      Nerve / Sites Rec. Site Onset Lat Peak Lat NP Amp PP Amp Segments Distance Velocity Comment     ms ms µV µV  cm m/s    R Sural - (Antidromic)      Calf Ankle 2.97 3.85 10.1 13.7 Calf - Ankle 13 44    L Sural - (Antidromic)      Calf Ankle 3.28 4.17 13.4 18.2 Calf - Ankle 13 40        Motor NCS      Nerve / Sites Muscle Latency Amplitude Segments Dist. Lat Diff Velocity Comments     ms mV  cm ms m/s    R Peroneal - EDB      Ankle EDB 5.15 7.1 Ankle - EDB 7         B. Fib Head EDB 12.08 5.3 B. Fib Head - Ankle 31.5 6.94 45.4    L Peroneal - EDB      Ankle EDB 4.90 6.9 Ankle - EDB 7         B. Fib Head EDB 11.92 5.8 B. Fib  Head - Ankle 30.5 7.02 43.4    R Tibial - AH      Ankle AH 4.63 7.4 Ankle - AH 8         Knee AH 13.31 5.2 Knee - Ankle 38 8.69 43.7    L Tibial - AH      Ankle AH 4.90 4.4 Ankle - AH 8         Knee AH 13.71 3.8 Knee - Ankle 38.5 8.81 43.7        F  Wave      Nerve M Latency F Latency    ms ms   R Peroneal - EDB 6.0 52.7   Ref.     R Tibial - AH 5.6 54.3   Ref.     L Tibial - AH 5.4 60.3   Ref.     L Peroneal - EDB 4.7 53.8   Ref.         EMG Summary Table     Spontaneous MUAP Recruitment   Muscle IA Fib PSW Fasc H.F. Amp Dur. PPP Pattern   R. Vastus medialis N None None None None N N N N   L. Vastus medialis N None None None None N N N N   R. Peroneus longus N None None None None N N N N   L. Peroneus longus N None None None None N N N N   R. Tibialis anterior N None None None None N N N N   L. Tibialis anterior N None None None None N N N N   R. Gastrocnemius 1+ None None 1+ None N N N N   L. Gastrocnemius 1+ None None 1+ None N N N N   R. Extensor hallucis longus N None None None None N N N N   L. Extensor hallucis longus N None None None None N N N N       Summary    The motor conduction test was unremarkable in all 4 of the tested nerves: R Peroneal - EDB, L Peroneal - EDB, R Tibial - AH, L Tibial - AH.    The sensory conduction test was unremarkable in all 2 of the tested nerves: R Sural - (Antidromic), L Sural - (Antidromic).    The F wave study was unremarkable in all 4 of the tested nerves: R Peroneal - EDB, R Tibial - AH, L Tibial - AH, L Peroneal - EDB    The needle EMG examination was performed in 10 muscles. It was normal in 8 muscle(s): R. Vastus medialis, L. Vastus medialis, R. Peroneus longus, L. Peroneus longus, R. Tibialis anterior, L. Tibialis anterior, R. Extensor hallucis longus, L. Extensor hallucis longus. The study was abnormal in 2 muscle(s), with the following distribution:  Abnormal spontaneous/insertional activity was found in R. Gastrocnemius, L. Gastrocnemius.        Conclusion:   This  is a borderline study. There are isolated fasciculations in bilateral gastrocnemius muscles. This is an isolated finding and otherwise there is no evidence for a large fiber polyneuropathy, primary demyelinating neuropathy, myopathy or motor neuronopathy. Of note, this test would not rule out a small fiber neuropathy or central etiology. Clinical correlation is advised.     Cameron Jovel MD, Neurology  [facility]  Pager [phone]  8/8/2024

## 2024-08-15 ENCOUNTER — TELEPHONE (OUTPATIENT)
Dept: FAMILY MEDICINE CLINIC | Facility: CLINIC | Age: 79
End: 2024-08-15

## 2024-08-15 NOTE — TELEPHONE ENCOUNTER
Dr. Jovel her neurologist-performed the testing and she had a consult they should discuss the results with her.  This is why she has not heard from our office.    Please inform

## 2024-08-15 NOTE — TELEPHONE ENCOUNTER
Please view test results in procedure tab dated 8/8/24.    Conclusion:   This is a borderline study. There are isolated fasciculations in bilateral gastrocnemius muscles. This is an isolated finding and otherwise there is no evidence for a large fiber polyneuropathy, primary demyelinating neuropathy, myopathy or motor neuronopathy. Of note, this test would not rule out a small fiber neuropathy or central etiology. Clinical correlation is advised.      Cameron Jovel MD, Neurology  [facility]  Pager [phone]  8/8/2024       Sent to Dr. Arndt for review and recommendations.

## 2024-08-15 NOTE — TELEPHONE ENCOUNTER
Patient looking for her results from her emg  on her leg.  She had it done on 8/8/24 and no one has called her.

## 2024-08-16 ENCOUNTER — TELEPHONE (OUTPATIENT)
Dept: NEUROLOGY | Facility: CLINIC | Age: 79
End: 2024-08-16

## 2024-08-16 NOTE — TELEPHONE ENCOUNTER
Patient calling she would like her EMG results today, also she is unable to see the report on kaila

## 2024-09-30 ENCOUNTER — OFFICE VISIT (OUTPATIENT)
Dept: NEUROLOGY | Facility: CLINIC | Age: 79
End: 2024-09-30
Payer: MEDICARE

## 2024-09-30 VITALS
BODY MASS INDEX: 23.92 KG/M2 | SYSTOLIC BLOOD PRESSURE: 116 MMHG | DIASTOLIC BLOOD PRESSURE: 72 MMHG | WEIGHT: 135 LBS | HEART RATE: 58 BPM | RESPIRATION RATE: 16 BRPM | HEIGHT: 63 IN

## 2024-09-30 DIAGNOSIS — R20.2 NUMBNESS AND TINGLING OF BOTH FEET: ICD-10-CM

## 2024-09-30 DIAGNOSIS — G25.81 RESTLESS LEGS SYNDROME: Primary | ICD-10-CM

## 2024-09-30 DIAGNOSIS — R20.0 NUMBNESS AND TINGLING OF BOTH FEET: ICD-10-CM

## 2024-09-30 PROCEDURE — 99214 OFFICE O/P EST MOD 30 MIN: CPT | Performed by: OTHER

## 2024-09-30 RX ORDER — GABAPENTIN 100 MG/1
100 CAPSULE ORAL NIGHTLY
Qty: 30 CAPSULE | Refills: 2 | Status: SHIPPED | OUTPATIENT
Start: 2024-09-30

## 2024-09-30 NOTE — PATIENT INSTRUCTIONS
Refill policies:    Allow 2-3 business days for refills; controlled substances may take longer.  Contact your pharmacy at least 5 days prior to running out of medication and have them send an electronic request or submit request through the “request refill” option in your [employer] account.  Refills are not addressed on weekends; covering physicians do not authorize routine medications on weekends.  No narcotics or controlled substances are refilled after noon on Fridays or by on call physicians.  By law, narcotics must be electronically prescribed.  A 30 day supply with no refills is the maximum allowed.  If your prescription is due for a refill, you may be due for a follow up appointment.  To best provide you care, patients receiving routine medications need to be seen at least once a year.  Patients receiving narcotic/controlled substance medications need to be seen at least once every 3 months.  In the event that your preferred pharmacy does not have the requested medication in stock (e.g. Backordered), it is your responsibility to find another pharmacy that has the requested medication available.  We will gladly send a new prescription to that pharmacy at your request.    Scheduling Tests:    If your physician has ordered radiology tests such as MRI or CT scans, please contact Central Scheduling at [phone] right away to schedule the test.  Once scheduled, the [facility] Centralized Referral Team will work with your insurance carrier to obtain pre-certification or prior authorization.  Depending on your insurance carrier, approval may take 3-10 days.  It is highly recommended patients assure they have received an authorization before having a test performed.  If test is done without insurance authorization, patient may be responsible for the entire amount billed.      Precertification and Prior Authorizations:  If your physician has recommended that you have a procedure or additional testing performed the [facility]  Centralized Referral Team will contact your insurance carrier to obtain pre-certification or prior authorization.    You are strongly encouraged to contact your insurance carrier to verify that your procedure/test has been approved and is a COVERED benefit.  Although the [facility] Centralized Referral Team does its due diligence, the insurance carrier gives the disclaimer that \"Although the procedure is authorized, this does not guarantee payment.\"    Ultimately the patient is responsible for payment.   Thank you for your understanding in this matter.  Paperwork Completion:  If you require FMLA or disability paperwork for your recovery, please make sure to either drop it off or have it faxed to our office at [phone]. Be sure the form has your name and date of birth on it.  The form will be faxed to our Forms Department and they will complete it for you.  There is a 25$ fee for all forms that need to be filled out.  Please be aware there is a 10-14 day turnaround time.  You will need to sign a release of information (IVORY) form if your paperwork does not come with one.  You may call the Forms Department with any questions at [phone].  Their fax number is [phone].

## 2024-09-30 NOTE — PROGRESS NOTES
[facility] Progress Note    HPI  Chief Complaint   Patient presents with    Neurologic Problem     Numbness/tingling bilat feet, reports no change in condition    Test Results     EMG 8/8       As per my initial H&P from 6/25/2024,   \" Sakshi Gao is a 79 year old, with PMHx including but not limited to osteopenia and hypothyroidism, who presents for evaluation of numbness and tingling in feet.  Patient states she noted gradual worsening of tingling in feet but worsening in the past year since being on Prolia for osteoporosis. She was previously feeling some numbness in feet but tingling in the past year. She denies tingling in hands and denies falls or significant balance issues or tripping over feet. She is on calcium supplement.  She was on gabapentin briefly when she had an episode of tingling / pain in the right neck and arm during pandemic but admits this resolved on own and was not able to tolerate gabapentin as she was drowsy on the medication.     Of note, she also has some neck pain.      Father had spinal stenosis.        Otherwise, patient denies any recent weight change, fevers, chills, nausea, double vision/ blurry vision / loss of vision, chest pain, palpitations, shortness of breath, rashes, joint pains, bowel / bladder incontinence or mood issues. \"       Patient since last visit has noted sensations in feet and legs mainly at night. She endorses some feeling of \"near cramping\" or compression in left more than right leg when lying down  and overall improves when walking; no bowel / bladder changes; has some left hip pain as well.         Past Medical History:    Allergic rhinitis    Anxiety    Atrophic vaginitis    Chronic pansinusitis    Corns and callosities    COVID-19    1/3/2022    Depression    Herpes zoster with other nervous system complications(053.19)    Hypothyroidism    acquired    Left bundle branch block (LBBB) on electrocardiogram    Left hand paresthesia     left middle, ring, 5th finger- tingling, no weakness or numbness. Resolved 2020. Cancel EMG appt 20.     Neoplasms of unspecified nature, other specified sites    Normal spontaneous vaginal delivery (HCC)    Osteoarthritis    Other malaise and fatigue    Other screening mammogram    Pap smear, as part of routine gynecological examination    Personal history of estrogen therapy    Seasonal allergies    Special screening for osteoporosis     Past Surgical History:   Procedure Laterality Date    Adenoidectomy      Cataract  2020    Colonoscopy  2003    Colonoscopy  3/12/13    Colonoscopy      Willa localization wire 1 site left (cpt=19281) Left     benign.    Willa localization wire 1 site right (cpt=19281) Right     benign.          Tonsillectomy       Family History   Problem Relation Age of Onset    Dementia Father     Diabetes Father         mellitus    Hypertension Father     Hypertension Mother     Breast Cancer Mother 93        brca at age 92, passed away at age 96.  possible trauma as cause.    Other (Other) Mother     Diabetes Maternal Grandmother     Diabetes Paternal Grandmother     Cancer Brother 68        melanoma.     Diabetes Maternal Uncle      Social History     Socioeconomic History    Marital status:    Tobacco Use    Smoking status: Former     Current packs/day: 0.00     Average packs/day: 0.3 packs/day for 2.0 years (0.6 ttl pk-yrs)     Types: Cigarettes     Start date: 1962     Quit date: 1964     Years since quittin.7     Passive exposure: Never    Smokeless tobacco: Never    Tobacco comments:     I was in college, everyone smoked.   Vaping Use    Vaping status: Never Used   Substance and Sexual Activity    Alcohol use: Yes     Comment: Occasional glass of wine    Drug use: Never   Other Topics Concern    Caffeine Concern Yes     Comment: 1 cup    Stress Concern Yes    Weight Concern No    Special Diet No    Exercise Yes     Comment:  yoga,walk,exercise class    Seat Belt Yes       Allergies   Allergen Reactions    Latex OTHER (SEE COMMENTS)    Sulfa Antibiotics HIVES    Sulfa Drugs Cross Reactors HIVES    Latex      Smell lingers, bothersome         Current Outpatient Medications:     gabapentin 100 MG Oral Cap, Take 1 capsule (100 mg total) by mouth nightly., Disp: 30 capsule, Rfl: 2    LEVOTHYROXINE 100 MCG Oral Tab, TAKE 1 TABLET DAILY, Disp: 90 tablet, Rfl: 3    ofloxacin 0.3 % Ophthalmic Solution, 1 gtt to affected eye(s) every 2 hours x 2 days then 1 gtt affected eye(s) every 6hours x 5 days- total is 7 days., Disp: 10 mL, Rfl: 0    diclofenac 1 % External Gel, Apply 2 g topically 4 (four) times daily as needed. Buy otc, Disp: 150 g, Rfl: 3    hydrocortisone 2.5 % External Cream, Apply to Affected Area of face twice daily when flared, then as needed., Disp: , Rfl:     ketoconazole 2 % External Shampoo, Apply to scalp 3-4 times a week as needed. Let sit 2-3 minutes then rinse. Use regular shampoo and conditioner after., Disp: , Rfl:     Acetaminophen ER (TYLENOL 8 HOUR ARTHRITIS PAIN) 650 MG Oral Tab CR, Take 2 tablets (1,300 mg total) by mouth every 8 (eight) hours as needed for Pain (Max is 6 tablets in 24 hours or 4000 mg of Tylenol/acetaminophen in 24 hours). Buy over the counter, Disp: 180 tablet, Rfl: 0    Calcium Carbonate 1500 (600 Ca) MG Oral Tab, Take 600 mg by mouth., Disp: , Rfl:     Melatonin 5 MG Oral Tab, 1/2 tab po nightly as needed, Disp: 30 tablet, Rfl: 0    Cholecalciferol (VITAMIN D) 2000 units Oral Cap, Take 1 capsule (2,000 Units total) by mouth daily. Adjusted on 6/3/2019 from 4000IU due to vit D level 70., Disp: 30 capsule, Rfl: 0    cetirizine 10 MG Oral Tab, Take 1 tablet (10 mg total) by mouth daily., Disp: , Rfl:     Multiple Vitamins-Minerals (EYE VITAMINS OR), Take  by mouth., Disp: , Rfl:     Multiple Vitamin (MULTI-VITAMIN OR), Take  by mouth., Disp: , Rfl:     Review of Systems:  No chest pain or  palpitations; otherwise as noted in HPI.    Exam:  /72 (BP Location: Left arm, Patient Position: Sitting, Cuff Size: adult)   Pulse 58   Resp 16   Ht 63\"   Wt 135 lb (61.2 kg)   LMP 10/25/1998 (LMP Unknown)   BMI 23.91 kg/m²   Estimated body mass index is 23.91 kg/m² as calculated from the following:    Height as of this encounter: 63\".    Weight as of this encounter: 135 lb (61.2 kg).    Gen: well developed, well nourished, no acute distress  HEENT: normocephalic  Heart; normal S1/S2, regular rate and rhythm  Lungs: clear to auscultation bilaterally  Extremities: no edema, peripheral pulses intact    Neck: supple, full range of motion; no carotid bruits    Fundoscopic Exam: optic discs sharp bilaterally    Neuro:  Mental status:  Orientation: Alert and oriented to person, place, time  Speech Fluent and conversational    CN: PERRL, EOMI with no nystagmus, VFF, smile symmetric, sensation intact, tongue and palate midline, SCM intact, otherwise, CN 2-12 intact  Motor: 5/5 strength throughout, tone normal  DTR: 2+ symmetric throughout, toes downgoing bilaterally, no clonus  Sensory: intact to light touch throughout  Coord: FNF intact with no tremor or dysmetria; rapid alternating movements intact bilaterally  Romberg: absent  Gait: normal casual, heel, toe and tandem gait    Labs:    Imaging:      Other procedures:     New    NCS/EMG (8/8/2024):     Sensory NCS      Nerve / Sites Rec. Site Onset Lat Peak Lat NP Amp PP Amp Segments Distance Velocity Comment     ms ms µV µV  cm m/s    R Sural - (Antidromic)      Calf Ankle 2.97 3.85 10.1 13.7 Calf - Ankle 13 44    L Sural - (Antidromic)      Calf Ankle 3.28 4.17 13.4 18.2 Calf - Ankle 13 40        Motor NCS      Nerve / Sites Muscle Latency Amplitude Segments Dist. Lat Diff Velocity Comments     ms mV  cm ms m/s    R Peroneal - EDB      Ankle EDB 5.15 7.1 Ankle - EDB 7         B. Fib Head EDB 12.08 5.3 B. Fib Head - Ankle 31.5 6.94 45.4    L Peroneal - EDB       Ankle EDB 4.90 6.9 Ankle - EDB 7         B. Fib Head EDB 11.92 5.8 B. Fib Head - Ankle 30.5 7.02 43.4    R Tibial - AH      Ankle AH 4.63 7.4 Ankle - AH 8         Knee AH 13.31 5.2 Knee - Ankle 38 8.69 43.7    L Tibial - AH      Ankle AH 4.90 4.4 Ankle - AH 8         Knee AH 13.71 3.8 Knee - Ankle 38.5 8.81 43.7        F  Wave      Nerve M Latency F Latency    ms ms   R Peroneal - EDB 6.0 52.7   Ref.     R Tibial - AH 5.6 54.3   Ref.     L Tibial - AH 5.4 60.3   Ref.     L Peroneal - EDB 4.7 53.8   Ref.         EMG Summary Table     Spontaneous MUAP Recruitment   Muscle IA Fib PSW Fasc H.F. Amp Dur. PPP Pattern   R. Vastus medialis N None None None None N N N N   L. Vastus medialis N None None None None N N N N   R. Peroneus longus N None None None None N N N N   L. Peroneus longus N None None None None N N N N   R. Tibialis anterior N None None None None N N N N   L. Tibialis anterior N None None None None N N N N   R. Gastrocnemius 1+ None None 1+ None N N N N   L. Gastrocnemius 1+ None None 1+ None N N N N   R. Extensor hallucis longus N None None None None N N N N   L. Extensor hallucis longus N None None None None N N N N       Summary    The motor conduction test was unremarkable in all 4 of the tested nerves: R Peroneal - EDB, L Peroneal - EDB, R Tibial - AH, L Tibial - AH.    The sensory conduction test was unremarkable in all 2 of the tested nerves: R Sural - (Antidromic), L Sural - (Antidromic).    The F wave study was unremarkable in all 4 of the tested nerves: R Peroneal - EDB, R Tibial - AH, L Tibial - AH, L Peroneal - EDB    The needle EMG examination was performed in 10 muscles. It was normal in 8 muscle(s): R. Vastus medialis, L. Vastus medialis, R. Peroneus longus, L. Peroneus longus, R. Tibialis anterior, L. Tibialis anterior, R. Extensor hallucis longus, L. Extensor hallucis longus. The study was abnormal in 2 muscle(s), with the following distribution:  Abnormal spontaneous/insertional activity  was found in R. Gastrocnemius, L. Gastrocnemius.        Conclusion:   This is a borderline study. There are isolated fasciculations in bilateral gastrocnemius muscles. This is an isolated finding and otherwise there is no evidence for a large fiber polyneuropathy, primary demyelinating neuropathy, myopathy or motor neuronopathy. Of note, this test would not rule out a small fiber neuropathy or central etiology. Clinical correlation is advised.            Impression/ Plan:  Sakshi Gao is a 79 year old  female with with PMHx including but not limited to osteopenia and hypothyroidism, who originally presented 6/25/2024 for evaluation of numbness and tingling in feet.  Patient states she noted gradual worsening of tingling in feet but worsening in the past year since being on Prolia for osteoporosis. She was previously feeling some numbness in feet but tingling in the past year. She denies tingling in hands and denies falls or significant balance issues or tripping over feet. She is on calcium supplement.  She was on gabapentin briefly in the past but was not able to tolerate gabapentin as she was drowsy on the medication.       NCS/EMG done showed no evidence for neuropathy only showing isolated fasciculations in both gastrocnemius muscles, which is of unclear etiology but could be due to lumbar radiculopathy - her symptoms in legs primarily at night, however, with no evidence for neuropathy on NCS/EMG, seem suggestive of restless legs syndrome.     This may be due to low magnesium or ferritin and will check magnesium level and iron studies to evaluate.   For treatment, recommend try low dose gabapentin 100 mg nightly to start -  patient was advised of potential side effects, including but not limited to rash, excessive sedation, weight gain and/or peripheral edema; patient was advised to notify office with any new or worsening symptoms.      1. Restless legs syndrome  As noted above   - gabapentin 100 MG Oral  Cap; Take 1 capsule (100 mg total) by mouth nightly.  Dispense: 30 capsule; Refill: 2  - Magnesium  - Iron And Tibc  - Ferritin    2. Numbness and tingling of both feet  As noted above   - Magnesium  - Iron And Tibc  - Ferritin    Return in about 2 months (around 11/30/2024).    Cameron Jovel MD, Neurology  [facility]  Pager [phone]  9/30/2024

## 2024-10-02 LAB
% SATURATION: 28 % (CALC) (ref 16–45)
FERRITIN: 77 NG/ML (ref 16–288)
IRON BINDING CAPACITY: 436 MCG/DL (CALC) (ref 250–450)
IRON, TOTAL: 122 MCG/DL (ref 45–160)
MAGNESIUM: 2.5 MG/DL (ref 1.5–2.5)

## 2024-10-16 ENCOUNTER — TELEPHONE (OUTPATIENT)
Dept: NEUROLOGY | Facility: CLINIC | Age: 79
End: 2024-10-16

## 2024-10-16 NOTE — TELEPHONE ENCOUNTER
Received document via mail from [employer].  The following labs possibly not covered by insurance:    FERRITIN  MAGNESIUM  IRON, TOTAL, and IBC    Per provider, Fatigue, other R53.83 added to document.  Faxed to [employer] with confirmation fax.

## 2024-11-13 ENCOUNTER — HOSPITAL ENCOUNTER (OUTPATIENT)
Dept: MAMMOGRAPHY | Age: 79
Discharge: HOME | End: 2024-11-13
Attending: FAMILY MEDICINE
Payer: MEDICARE

## 2024-11-13 DIAGNOSIS — Z12.31 VISIT FOR SCREENING MAMMOGRAM: ICD-10-CM

## 2024-11-13 PROCEDURE — 77067 SCR MAMMO BI INCL CAD: CPT | Performed by: FAMILY MEDICINE

## 2024-11-13 PROCEDURE — 77063 BREAST TOMOSYNTHESIS BI: CPT | Performed by: FAMILY MEDICINE

## 2024-12-02 ENCOUNTER — OFFICE VISIT (OUTPATIENT)
Dept: NEUROLOGY | Facility: CLINIC | Age: 79
End: 2024-12-02
Payer: MEDICARE

## 2024-12-02 VITALS
WEIGHT: 135 LBS | DIASTOLIC BLOOD PRESSURE: 62 MMHG | BODY MASS INDEX: 23.92 KG/M2 | HEIGHT: 63 IN | HEART RATE: 63 BPM | SYSTOLIC BLOOD PRESSURE: 132 MMHG | RESPIRATION RATE: 16 BRPM

## 2024-12-02 DIAGNOSIS — M79.605 LUMBAR PAIN WITH RADIATION DOWN LEFT LEG: Primary | ICD-10-CM

## 2024-12-02 DIAGNOSIS — G25.81 RESTLESS LEGS SYNDROME: ICD-10-CM

## 2024-12-02 DIAGNOSIS — M54.50 LUMBAR PAIN WITH RADIATION DOWN LEFT LEG: Primary | ICD-10-CM

## 2024-12-02 PROCEDURE — 1159F MED LIST DOCD IN RCRD: CPT | Performed by: OTHER

## 2024-12-02 PROCEDURE — 99213 OFFICE O/P EST LOW 20 MIN: CPT | Performed by: OTHER

## 2024-12-02 PROCEDURE — 1160F RVW MEDS BY RX/DR IN RCRD: CPT | Performed by: OTHER

## 2024-12-02 NOTE — PATIENT INSTRUCTIONS
Refill policies:    Allow 2-3 business days for refills; controlled substances may take longer.  Contact your pharmacy at least 5 days prior to running out of medication and have them send an electronic request or submit request through the “request refill” option in your [employer] account.  Refills are not addressed on weekends; covering physicians do not authorize routine medications on weekends.  No narcotics or controlled substances are refilled after noon on Fridays or by on call physicians.  By law, narcotics must be electronically prescribed.  A 30 day supply with no refills is the maximum allowed.  If your prescription is due for a refill, you may be due for a follow up appointment.  To best provide you care, patients receiving routine medications need to be seen at least once a year.  Patients receiving narcotic/controlled substance medications need to be seen at least once every 3 months.  In the event that your preferred pharmacy does not have the requested medication in stock (e.g. Backordered), it is your responsibility to find another pharmacy that has the requested medication available.  We will gladly send a new prescription to that pharmacy at your request.    Scheduling Tests:    If your physician has ordered radiology tests such as MRI or CT scans, please contact Central Scheduling at [phone] right away to schedule the test.  Once scheduled, the [facility] Centralized Referral Team will work with your insurance carrier to obtain pre-certification or prior authorization.  Depending on your insurance carrier, approval may take 3-10 days.  It is highly recommended patients assure they have received an authorization before having a test performed.  If test is done without insurance authorization, patient may be responsible for the entire amount billed.      Precertification and Prior Authorizations:  If your physician has recommended that you have a procedure or additional testing performed the [facility]  Centralized Referral Team will contact your insurance carrier to obtain pre-certification or prior authorization.    You are strongly encouraged to contact your insurance carrier to verify that your procedure/test has been approved and is a COVERED benefit.  Although the [facility] Centralized Referral Team does its due diligence, the insurance carrier gives the disclaimer that \"Although the procedure is authorized, this does not guarantee payment.\"    Ultimately the patient is responsible for payment.   Thank you for your understanding in this matter.  Paperwork Completion:  If you require FMLA or disability paperwork for your recovery, please make sure to either drop it off or have it faxed to our office at [phone]. Be sure the form has your name and date of birth on it.  The form will be faxed to our Forms Department and they will complete it for you.  There is a 25$ fee for all forms that need to be filled out.  Please be aware there is a 10-14 day turnaround time.  You will need to sign a release of information (IVORY) form if your paperwork does not come with one.  You may call the Forms Department with any questions at [phone].  Their fax number is [phone].

## 2024-12-02 NOTE — PROGRESS NOTES
[facility] Progress Note    HPI  Chief Complaint   Patient presents with    Neurologic Problem     RLS/Bilat foot numbness/tingling, feels conditions are stable, notes moments of instability when \"turning too fast\", denies trips/falls       As per my initial H&P from 6/25/2024,   \" Sakshi Gao is a 79 year old, with PMHx including but not limited to osteopenia and hypothyroidism, who presents for evaluation of numbness and tingling in feet.  Patient states she noted gradual worsening of tingling in feet but worsening in the past year since being on Prolia for osteoporosis. She was previously feeling some numbness in feet but tingling in the past year. She denies tingling in hands and denies falls or significant balance issues or tripping over feet. She is on calcium supplement.  She was on gabapentin briefly when she had an episode of tingling / pain in the right neck and arm during pandemic but admits this resolved on own and was not able to tolerate gabapentin as she was drowsy on the medication.     Of note, she also has some neck pain.      Father had spinal stenosis.        Otherwise, patient denies any recent weight change, fevers, chills, nausea, double vision/ blurry vision / loss of vision, chest pain, palpitations, shortness of breath, rashes, joint pains, bowel / bladder incontinence or mood issues. \"       Prior notes as per 9/30/2024.  Patient since last visit has noted sensations in feet and legs mainly at night. She endorses some feeling of \"near cramping\" or compression in left more than right leg when lying down  and overall improves when walking; no bowel / bladder changes; has some left hip pain as well.        Patient last seen 9/30/2024. She has been taking gabapentin 100 mg intermittently at night and sleeps well when she takes this. She mainly notes pain in the left hip radiating down to left leg down to left foot, which is worse when lying down. She denies bowel /  bladder incontinence. She denies tripping over feet. She notes when she takes gabapentin she sleeps well but is drowsy in the morning as well; she has not been taking on regular basis.          Past Medical History:    Allergic rhinitis    Anxiety    Atrophic vaginitis    Chronic pansinusitis    Corns and callosities    COVID-19    1/3/2022    Depression    Herpes zoster with other nervous system complications(053.19)    Hypothyroidism    acquired    Left bundle branch block (LBBB) on electrocardiogram    Left hand paresthesia    left middle, ring, 5th finger- tingling, no weakness or numbness. Resolved 2020. Cancel EMG appt 20.     Neoplasms of unspecified nature, other specified sites    Normal spontaneous vaginal delivery (HCC)    Osteoarthritis    Other malaise and fatigue    Other screening mammogram    Pap smear, as part of routine gynecological examination    Personal history of estrogen therapy    Seasonal allergies    Special screening for osteoporosis     Past Surgical History:   Procedure Laterality Date    Adenoidectomy      Cataract  2020    Colonoscopy  2003    Colonoscopy  3/12/13    Colonoscopy      Willa localization wire 1 site left (cpt=19281) Left     benign.    Willa localization wire 1 site right (cpt=19281) Right     benign.          Tonsillectomy       Family History   Problem Relation Age of Onset    Dementia Father     Diabetes Father         mellitus    Hypertension Father     Hypertension Mother     Breast Cancer Mother 93        brca at age 92, passed away at age 96.  possible trauma as cause.    Other (Other) Mother     Diabetes Maternal Grandmother     Diabetes Paternal Grandmother     Cancer Brother 68        melanoma.     Diabetes Maternal Uncle      Social History     Socioeconomic History    Marital status:    Tobacco Use    Smoking status: Former     Current packs/day: 0.00     Average packs/day: 0.3 packs/day for 2.0 years (0.6 ttl pk-yrs)      Types: Cigarettes     Start date: 1962     Quit date: 1964     Years since quittin.9     Passive exposure: Never    Smokeless tobacco: Never    Tobacco comments:     I was in college, everyone smoked.   Vaping Use    Vaping status: Never Used   Substance and Sexual Activity    Alcohol use: Yes     Comment: Occasional glass of wine    Drug use: Never   Other Topics Concern    Caffeine Concern Yes     Comment: 1 cup    Stress Concern Yes    Weight Concern No    Special Diet No    Exercise Yes     Comment: yoga,walk,exercise class    Seat Belt Yes       Allergies   Allergen Reactions    Latex OTHER (SEE COMMENTS)    Sulfa Antibiotics HIVES    Sulfa Drugs Cross Reactors HIVES    Latex      Smell lingers, bothersome         Current Outpatient Medications:     gabapentin 100 MG Oral Cap, Take 1 capsule (100 mg total) by mouth nightly., Disp: 30 capsule, Rfl: 2    LEVOTHYROXINE 100 MCG Oral Tab, TAKE 1 TABLET DAILY, Disp: 90 tablet, Rfl: 3    diclofenac 1 % External Gel, Apply 2 g topically 4 (four) times daily as needed. Buy otc, Disp: 150 g, Rfl: 3    hydrocortisone 2.5 % External Cream, Apply to Affected Area of face twice daily when flared, then as needed., Disp: , Rfl:     ketoconazole 2 % External Shampoo, Apply to scalp 3-4 times a week as needed. Let sit 2-3 minutes then rinse. Use regular shampoo and conditioner after., Disp: , Rfl:     Acetaminophen ER (TYLENOL 8 HOUR ARTHRITIS PAIN) 650 MG Oral Tab CR, Take 2 tablets (1,300 mg total) by mouth every 8 (eight) hours as needed for Pain (Max is 6 tablets in 24 hours or 4000 mg of Tylenol/acetaminophen in 24 hours). Buy over the counter, Disp: 180 tablet, Rfl: 0    Calcium Carbonate 1500 (600 Ca) MG Oral Tab, Take 600 mg by mouth., Disp: , Rfl:     Melatonin 5 MG Oral Tab, 1/2 tab po nightly as needed, Disp: 30 tablet, Rfl: 0    Cholecalciferol (VITAMIN D) 2000 units Oral Cap, Take 1 capsule (2,000 Units total) by mouth daily. Adjusted on 6/3/2019 from  4000IU due to vit D level 70., Disp: 30 capsule, Rfl: 0    cetirizine 10 MG Oral Tab, Take 1 tablet (10 mg total) by mouth daily., Disp: , Rfl:     Multiple Vitamins-Minerals (EYE VITAMINS OR), Take  by mouth., Disp: , Rfl:     Multiple Vitamin (MULTI-VITAMIN OR), Take  by mouth., Disp: , Rfl:     Review of Systems:  No chest pain or palpitations; otherwise as noted in HPI.    Exam:  /62 (BP Location: Left arm, Patient Position: Sitting, Cuff Size: adult)   Pulse 63   Resp 16   Ht 63\"   Wt 135 lb (61.2 kg)   LMP 10/25/1998 (LMP Unknown)   BMI 23.91 kg/m²   Estimated body mass index is 23.91 kg/m² as calculated from the following:    Height as of this encounter: 63\".    Weight as of this encounter: 135 lb (61.2 kg).    Gen: well developed, well nourished, no acute distress  HEENT: normocephalic  Heart; normal S1/S2, regular rate and rhythm  Lungs: clear to auscultation bilaterally  Extremities: no edema, peripheral pulses intact    Neck: supple, full range of motion; no carotid bruits    Fundoscopic Exam: optic discs sharp bilaterally    Neuro:  Mental status:  Orientation: Alert and oriented to person, place, time  Speech Fluent and conversational    CN: PERRL, EOMI with no nystagmus, VFF, smile symmetric, sensation intact, tongue and palate midline, SCM intact, otherwise, CN 2-12 intact  Motor: 5/5 strength throughout, tone normal  DTR: 2+ symmetric throughout, toes downgoing bilaterally, no clonus  Sensory: intact to light touch throughout  Coord: FNF intact with no tremor or dysmetria; rapid alternating movements intact bilaterally  Romberg: absent  Gait: normal casual, heel, toe and tandem gait    SHAWN test: weakly positive to left side    Labs:  New  Component      Latest Ref Rng 10/1/2024   IRON, TOTAL      45 - 160 mcg/dL 122    IRON BINDING CAPACITY      250 - 450 mcg/dL (calc) 436    % SATURATION      16 - 45 % (calc) 28    Magnesium, Serum      1.5 - 2.5 mg/dL 2.5    FERRITIN      16 - 288  ng/mL 77          Imaging:  None new    Other procedures:     None new    Prior as noted below    NCS/EMG (8/8/2024):     Sensory NCS      Nerve / Sites Rec. Site Onset Lat Peak Lat NP Amp PP Amp Segments Distance Velocity Comment     ms ms µV µV  cm m/s    R Sural - (Antidromic)      Calf Ankle 2.97 3.85 10.1 13.7 Calf - Ankle 13 44    L Sural - (Antidromic)      Calf Ankle 3.28 4.17 13.4 18.2 Calf - Ankle 13 40        Motor NCS      Nerve / Sites Muscle Latency Amplitude Segments Dist. Lat Diff Velocity Comments     ms mV  cm ms m/s    R Peroneal - EDB      Ankle EDB 5.15 7.1 Ankle - EDB 7         B. Fib Head EDB 12.08 5.3 B. Fib Head - Ankle 31.5 6.94 45.4    L Peroneal - EDB      Ankle EDB 4.90 6.9 Ankle - EDB 7         B. Fib Head EDB 11.92 5.8 B. Fib Head - Ankle 30.5 7.02 43.4    R Tibial - AH      Ankle AH 4.63 7.4 Ankle - AH 8         Knee AH 13.31 5.2 Knee - Ankle 38 8.69 43.7    L Tibial - AH      Ankle AH 4.90 4.4 Ankle - AH 8         Knee AH 13.71 3.8 Knee - Ankle 38.5 8.81 43.7        F  Wave      Nerve M Latency F Latency    ms ms   R Peroneal - EDB 6.0 52.7   Ref.     R Tibial - AH 5.6 54.3   Ref.     L Tibial - AH 5.4 60.3   Ref.     L Peroneal - EDB 4.7 53.8   Ref.         EMG Summary Table     Spontaneous MUAP Recruitment   Muscle IA Fib PSW Fasc H.F. Amp Dur. PPP Pattern   R. Vastus medialis N None None None None N N N N   L. Vastus medialis N None None None None N N N N   R. Peroneus longus N None None None None N N N N   L. Peroneus longus N None None None None N N N N   R. Tibialis anterior N None None None None N N N N   L. Tibialis anterior N None None None None N N N N   R. Gastrocnemius 1+ None None 1+ None N N N N   L. Gastrocnemius 1+ None None 1+ None N N N N   R. Extensor hallucis longus N None None None None N N N N   L. Extensor hallucis longus N None None None None N N N N       Summary    The motor conduction test was unremarkable in all 4 of the tested nerves: R Peroneal - EDB, L  Peroneal - EDB, R Tibial - AH, L Tibial - AH.    The sensory conduction test was unremarkable in all 2 of the tested nerves: R Sural - (Antidromic), L Sural - (Antidromic).    The F wave study was unremarkable in all 4 of the tested nerves: R Peroneal - EDB, R Tibial - AH, L Tibial - AH, L Peroneal - EDB    The needle EMG examination was performed in 10 muscles. It was normal in 8 muscle(s): R. Vastus medialis, L. Vastus medialis, R. Peroneus longus, L. Peroneus longus, R. Tibialis anterior, L. Tibialis anterior, R. Extensor hallucis longus, L. Extensor hallucis longus. The study was abnormal in 2 muscle(s), with the following distribution:  Abnormal spontaneous/insertional activity was found in R. Gastrocnemius, L. Gastrocnemius.        Conclusion:   This is a borderline study. There are isolated fasciculations in bilateral gastrocnemius muscles. This is an isolated finding and otherwise there is no evidence for a large fiber polyneuropathy, primary demyelinating neuropathy, myopathy or motor neuronopathy. Of note, this test would not rule out a small fiber neuropathy or central etiology. Clinical correlation is advised.            Impression/ Plan:  Sakshi Gao is a 79 year old  female with with PMHx including but not limited to osteopenia and hypothyroidism, who originally presented 6/25/2024 for evaluation of numbness and tingling in feet.  Patient states she noted gradual worsening of tingling in feet but worsening in the past year since being on Prolia for osteoporosis. She was previously feeling some numbness in feet but tingling in the past year. She denies tingling in hands and denies falls or significant balance issues or tripping over feet. She is on calcium supplement.  She was on gabapentin briefly in the past but was not able to tolerate gabapentin as she was drowsy on the medication.       NCS/EMG done showed no evidence for neuropathy only showing isolated fasciculations in both gastrocnemius  muscles, which is of unclear etiology but could be due to lumbar radiculopathy - her symptoms in legs primarily at night, however, with no evidence for neuropathy on NCS/EMG, could be due to restless legs syndrome as well; - magnesium and ferritin in normal range - she likely has combination of lumbar radiculopathy and/or sacroiliac disease and recommend evaluation by PT; in addition, recommend resume taking gabapentin but take on regular basis rather than intermittently   Patient was advised of potential side effects, including but not limited to rash, excessive sedation, weight gain and/or peripheral edema; patient was advised to notify office with any new or worsening symptoms.    1. Lumbar pain with radiation down left leg  As noted above   - Physical Therapy Referral - [facility]    2. Restless legs syndrome  As noted above     Return in about 3 months (around 3/2/2025).    Cameron Jovel MD, Neurology  [facility] [place]  Pager [phone]  12/2/2024

## 2024-12-03 ENCOUNTER — TELEPHONE (OUTPATIENT)
Dept: NEUROLOGY | Facility: CLINIC | Age: 79
End: 2024-12-03

## 2024-12-03 DIAGNOSIS — M79.605 LUMBAR PAIN WITH RADIATION DOWN LEFT LEG: Primary | ICD-10-CM

## 2024-12-03 DIAGNOSIS — M54.50 LUMBAR PAIN WITH RADIATION DOWN LEFT LEG: Primary | ICD-10-CM

## 2024-12-03 DIAGNOSIS — R20.2 NUMBNESS AND TINGLING OF BOTH FEET: ICD-10-CM

## 2024-12-03 DIAGNOSIS — R20.0 NUMBNESS AND TINGLING OF BOTH FEET: ICD-10-CM

## 2024-12-03 DIAGNOSIS — G25.81 RESTLESS LEGS SYNDROME: ICD-10-CM

## 2024-12-03 LAB
BUN: 13 MG/DL (ref 7–25)
CALCIUM: 9.3 MG/DL (ref 8.6–10.4)
CARBON DIOXIDE: 27 MMOL/L (ref 20–32)
CHLORIDE: 105 MMOL/L (ref 98–110)
CREATININE: 0.74 MG/DL (ref 0.6–0.95)
EGFR: 81 ML/MIN/1.73M2
GLUCOSE: 87 MG/DL (ref 65–99)
POTASSIUM: 4 MMOL/L (ref 3.5–5.3)
SODIUM: 141 MMOL/L (ref 135–146)

## 2024-12-03 NOTE — TELEPHONE ENCOUNTER
Dr. Jovel ordered for patient to go to PT. Patient went to [facility].     Original PT order faxed.   New PT order placed.     [facility] PT expressed understanding.

## 2024-12-03 NOTE — TELEPHONE ENCOUNTER
PT evaluation/ orders from [facility] received. Notes not visible in care everywhere. Orders placed on provider's desk for review and signature.

## 2024-12-20 ENCOUNTER — OFFICE VISIT (OUTPATIENT)
Dept: FAMILY MEDICINE CLINIC | Facility: CLINIC | Age: 79
End: 2024-12-20
Payer: MEDICARE

## 2024-12-20 VITALS
HEART RATE: 68 BPM | RESPIRATION RATE: 18 BRPM | TEMPERATURE: 98 F | BODY MASS INDEX: 24.16 KG/M2 | DIASTOLIC BLOOD PRESSURE: 76 MMHG | HEIGHT: 63 IN | SYSTOLIC BLOOD PRESSURE: 126 MMHG | OXYGEN SATURATION: 98 % | WEIGHT: 136.38 LBS

## 2024-12-20 DIAGNOSIS — M85.80 OSTEOPENIA WITH HIGH RISK OF FRACTURE: Primary | ICD-10-CM

## 2024-12-20 DIAGNOSIS — M79.605 LEFT LEG PAIN: ICD-10-CM

## 2024-12-20 DIAGNOSIS — R20.2 PARESTHESIA OF BOTH FEET: ICD-10-CM

## 2024-12-20 NOTE — PROGRESS NOTES
Patient is present today to received Prolia injection, injection given in right arm subcutaneous, patient tolerated well        Next injection due 06/20/2024

## 2024-12-20 NOTE — PROGRESS NOTES
CHIEF COMPLAINT:   Chief Complaint   Patient presents with    Medication Follow-Up    Injection     Prolia injection          HPI:     Sakshi Gao is a 79 year old female presents for medication monitoring.started on Prolia 60mg on 5/11/2023.  Has developed some tingling in her feet and leg pain on the left leg radiating down the leg diagnosed with lumbar pain with radiation down left leg by neurologist and referred to physical therapy.  Patient was also diagnosed with restless leg and prescribed gabapentin.  Patient states gabapentin helps her sleep but causes brain fog and mental fogginess the next day.  Has difficulty making decisions and processing information.  States with physical therapy pain is lessened so able to sleep and not taking gabapentin.  Using Tylenol and sleeps well.  Had EMG was performed on 10 muscles all were normal except there is some abnormal spontaneous activity in the right and left gastrocnemius only.  Though this is due to isolated fasciculations but there is no other finding of large fiber piling neuropathy, primary demyelinating neuropathy, myopathy or motor neuronopathy.      Osteopenia with high fall risk.  On Prolia x 1.5 year.  Patient is concerned about potential side effects.  Developed tingling in the bottoms of her feet over the past several months.  Has had some pains and aches in her legs and hips but is responding to physical therapy feels not related to Prolia.  Denies any joint swelling or hand pain.  C  Was previously on alendronate then developed side effects-achiness in her bones and and digestive issues-stomach uneasiness and increased burping after ingestion.  Denies dysphagia, melena and epigastric pain.  Previously tried on Boniva monthly but side effects were worse.  Could not tolerate.  Denies any falls.  Denies fractures.  States balance is normal.   Last DEXA was 12/11/2023 with osteopenia of femoral neck with T-score -1.3 with a 6.3% change from prior  examination, total hip T-score -0.4 and lumbar T score 1.1.  FRAX score  12% for 10-year risk and decreased risk of fracture with hip at 2.8%.  Wants to continue on Prolia.      HISTORY:  Past Medical History:    Allergic rhinitis    Anxiety    Atrophic vaginitis    Chronic pansinusitis    Corns and callosities    COVID-19    1/3/2022    Depression    Herpes zoster with other nervous system complications(053.19)    Hypothyroidism    acquired    Left bundle branch block (LBBB) on electrocardiogram    Left hand paresthesia    left middle, ring, 5th finger- tingling, no weakness or numbness. Resolved 2020. Cancel EMG appt 20.     Neoplasms of unspecified nature, other specified sites    Normal spontaneous vaginal delivery (HCC)    Osteoarthritis    Other malaise and fatigue    Other screening mammogram    Pap smear, as part of routine gynecological examination    Personal history of estrogen therapy    Seasonal allergies    Special screening for osteoporosis      Past Surgical History:   Procedure Laterality Date    Adenoidectomy      Cataract  2020    Colonoscopy  2003    Colonoscopy  3/12/13    Colonoscopy      Willa localization wire 1 site left (cpt=19281) Left     benign.    Willa localization wire 1 site right (cpt=19281) Right     benign.          Tonsillectomy        Family History   Problem Relation Age of Onset    Dementia Father     Diabetes Father         mellitus    Hypertension Father     Hypertension Mother     Breast Cancer Mother 93        brca at age 92, passed away at age 96.  possible trauma as cause.    Other (Other) Mother     Diabetes Maternal Grandmother     Diabetes Paternal Grandmother     Cancer Brother 68        melanoma.     Diabetes Maternal Uncle       Social History:   Social History     Socioeconomic History    Marital status:    Tobacco Use    Smoking status: Former     Current packs/day: 0.00     Average packs/day: 0.3 packs/day for 2.0 years (0.6  ttl pk-yrs)     Types: Cigarettes     Start date: 1962     Quit date: 1964     Years since quittin.0     Passive exposure: Never    Smokeless tobacco: Never    Tobacco comments:     I was in college, everyone smoked.   Vaping Use    Vaping status: Never Used   Substance and Sexual Activity    Alcohol use: Yes     Comment: Occasional glass of wine    Drug use: Never   Other Topics Concern    Caffeine Concern No    Stress Concern Yes    Weight Concern No    Special Diet No    Exercise Yes    Seat Belt Yes        Medications (Active prior to today's visit):  Current Outpatient Medications   Medication Sig Dispense Refill    gabapentin 100 MG Oral Cap Take 1 capsule (100 mg total) by mouth nightly. 30 capsule 2    LEVOTHYROXINE 100 MCG Oral Tab TAKE 1 TABLET DAILY 90 tablet 3    diclofenac 1 % External Gel Apply 2 g topically 4 (four) times daily as needed. Buy otc 150 g 3    hydrocortisone 2.5 % External Cream Apply to Affected Area of face twice daily when flared, then as needed.      ketoconazole 2 % External Shampoo Apply to scalp 3-4 times a week as needed. Let sit 2-3 minutes then rinse. Use regular shampoo and conditioner after.      Acetaminophen ER (TYLENOL 8 HOUR ARTHRITIS PAIN) 650 MG Oral Tab CR Take 2 tablets (1,300 mg total) by mouth every 8 (eight) hours as needed for Pain (Max is 6 tablets in 24 hours or 4000 mg of Tylenol/acetaminophen in 24 hours). Buy over the counter 180 tablet 0    Calcium Carbonate 1500 (600 Ca) MG Oral Tab Take 600 mg by mouth.      Melatonin 5 MG Oral Tab 1/2 tab po nightly as needed 30 tablet 0    Cholecalciferol (VITAMIN D) 2000 units Oral Cap Take 1 capsule (2,000 Units total) by mouth daily. Adjusted on 6/3/2019 from 4000IU due to vit D level 70. 30 capsule 0    cetirizine 10 MG Oral Tab Take 1 tablet (10 mg total) by mouth daily.      Multiple Vitamins-Minerals (EYE VITAMINS OR) Take  by mouth.      Multiple Vitamin (MULTI-VITAMIN OR) Take  by mouth.          Allergies:  Allergies[1]    PSFH elements reviewed from today and agreed except as otherwise stated in HPI.  PHYSICAL EXAM:   /76   Pulse 68   Temp 97.5 °F (36.4 °C) (Temporal)   Resp 18   Ht 5' 3\" (1.6 m)   Wt 136 lb 6.4 oz (61.9 kg)   LMP 10/25/1998 (LMP Unknown)   SpO2 98%   BMI 24.16 kg/m²   Vital signs reviewed.Appears stated age, well groomed.  Physical Exam   General: Well-nourished, well hydrated. No acute distress. No pallor. No tachypnea. Non toxic.  HEENT: normocephaly, atraumatic.  Sclera clear and non icteric bilaterally.  Oral pharynx clear without normal finding.  Moist mucous membranes.  Neck: supple. No adenopathy. No thyromegaly.   Heart: RRR without S3 or S4 murmur . Clear S1S2  Lungs: clear to auscultation bilaterally. No rales, rhonchi or wheezes. Good inspiratory and expiratory effort  Abdomen: soft, nontender, nondistended. NL bowel sounds.   Extremities: No cyanosis, clubbing, or edema bilaterally.   Skin: no acute rashes  Neuro: AOx3.  Normal gait      LABS     No visits with results within 2 Month(s) from this visit.   Latest known visit with results is:   Office Visit on 09/30/2024   Component Date Value    MAGNESIUM 10/01/2024 2.5     IRON, TOTAL 10/01/2024 122     IRON BINDING CAPACITY 10/01/2024 436     % SATURATION 10/01/2024 28     FERRITIN 10/01/2024 77       Collected 12/2/2024  7:18 AM       Status: Final result       Dx: Osteopenia with high risk of fracture    2 Result Notes       1 Patient Communication      Component  Ref Range & Units 12/2/24  7:18 AM   GLUCOSE  65 - 99 mg/dL 87   Comment:               Fasting reference interval      UREA NITROGEN (BUN)  7 - 25 mg/dL 13   CREATININE  0.60 - 0.95 mg/dL 0.74   EGFR  > OR = 60 mL/min/1.73m2 81   BUN/CREATININE RATIO  6 - 22 (calc) SEE NOTE:   Comment:    Not Reported: BUN and Creatinine are within     reference range.        SODIUM  135 - 146 mmol/L 141   POTASSIUM  3.5 - 5.3 mmol/L 4.0   CHLORIDE  98 - 110  mmol/L 105   CARBON DIOXIDE  20 - 32 mmol/L 27   CALCIUM  8.6 - 10.4 mg/dL 9.3   Resulting Agency [facility]              Narrative  Performed by: [facility]  FASTING:YES    FASTING: YES   Specimen Collected: 12/02/24  7:18 AM Last Resulted: 12/03/24  1:20 AM           Study Result    Narrative   PROCEDURE:  XR DEXA BONE DENSITOMETRY (CPT=77080)     COMPARISON:  [facility], XR, XR DEXA BONE DENSITOMETRY (CPT=77080), 7/12/2021, 10:06 AM.     INDICATIONS:  M85.80 Osteopenia with high risk of fracture     PATIENT STATED HISTORY: (As transcribed by Technologist)  Osteopenia          LUMBAR SPINE ANALYSIS RESULTS:      Bone mineral density (BMD) (g/cm2):  1.168    Lumbar T-Score:  1.1      % young normals:  112      % age matched controls:  156      Change from prior spine examination:  3.8*%              TOTAL HIP ANALYSIS RESULTS:        Bone mineral density (BMD) (g/cm2):  .898      Total Hip T-Score:  -0.4      % young normals:  95      % age matched controls:  131      Change from prior hip examination:  1.5%              FEMORAL NECK ANALYSIS RESULTS:        Bone mineral density (BMD) (g/cm2):  .702      Femoral neck T-Score:  -1.3      % young normals:  83      % age matched controls:  119      Change from prior hip examination:  6.3*%            ADDITIONAL FINDINGS:  FRAX = 12%/2.8%.                     Impression   CONCLUSION:  Bone mineral density values for left femoral neck are compatible with the diagnosis of osteopenia by WHO definition (T score between -1.0 and -2.5).  If therapy is initiated, follow-up study is recommended in 2 years for further evaluation  of therapeutic efficacy.             The World Health Organization has defined the following categories based on bone density:  Normal bone:  T-score better than -1  Osteopenia: T-score between -1 and -2.5  Osteoporosis:  T-score less than -2.5        LOCATION:  [place]              Dictated by (CST): Esme Lind MD on 12/11/2023 at 12:12 PM      Finalized by  (CST): Esme Lind MD on 12/11/2023 at 12:13 PM           REVIEWED THIS VISIT  ASSESSMENT/PLAN:   79 year old female with    1. Osteopenia with high risk of fracture  - denosumab (Prolia) 60 MG/ML SUBQ injection 60 mg-given today  Improvement on dexa scan  Continue x 3 years 5/2026 if tolerating  Then transition to bisphosphonate x1 year. Intolerant to fosamax and boniva, may need to have consult with endocrinology  Continue vit D and calcium and weight bearing exercises 3 x week x 30 mins  Dexa q 2 years    2. Left leg pain  Intolerant to gabapentin due to side effects-brain fogginess  Im[facility] with PT at [facility]   Continue PT  Suspect radiculopathy  Controlling with tylenol  Follow up with  as directed    3. Paresthesia of both feet  Normal EMGs except mild abnormality in calves per pt  Prolia? Possible side effect?  Pt wants to continue with prolia  Denies loss of sensation of foot    The patient and provider have a longitudinal relationship to address/treat the serious or complex condition as stated in this encounter.          Meds This Visit:  Requested Prescriptions      No prescriptions requested or ordered in this encounter       Health Maintenance:  Health Maintenance   Topic Date Due    Influenza Vaccine  Completed    DEXA Scan  Completed    MA Annual Health Assessment  Completed    Annual Depression Screening  Completed    Fall Risk Screening (Annual)  Completed    Pneumococcal Vaccine: 65+ Years  Completed    Zoster Vaccines  Completed    COVID-19 Vaccine  Completed         Patient/Caregiver Education: Patient/Caregiver Education: There are no barriers to learning. Medical education done.   Outcome: Patient verbalizes understanding. Patient is notified to call with any questions, comp lications, allergies, or worsening or changing symptoms.  Patient is to call with any side effects or complications from the treatments as a result of today.     Problem List:     Patient Active Problem List    Diagnosis    Hypothyroidism    History of vitamin D deficiency    Vaginal atrophy    Bunion of great toe    Primary osteoarthritis of both hands    Osteopenia with high risk of fracture    DNR (do not resuscitate)    Slow transit constipation    Abdominal bloating    Neck pain on right side    Tingling of both feet    Periorbital cellulitis of right eye    Hordeolum externum of right lower eyelid    History of hordeolum       Imaging & Referrals:  None     12/20/2024  Acacia Arndt,       Patient understands plan and follow-up.  Return in about 5 months (around 5/20/2025) for medicare physical, discuss prolia due 6/20/2025.          [1]   Allergies  Allergen Reactions    Latex OTHER (SEE COMMENTS)    Sulfa Antibiotics HIVES    Sulfa Drugs Cross Reactors HIVES    Latex      Smell lingers, bothersome

## 2024-12-24 ENCOUNTER — TELEPHONE (OUTPATIENT)
Dept: NEUROLOGY | Facility: CLINIC | Age: 79
End: 2024-12-24

## 2024-12-24 NOTE — TELEPHONE ENCOUNTER
Physical therapy discharge summary received from [facility].      Notes available to view in Care Everywhere in Epic.  Copy placed on provider's desk.

## 2024-12-26 ENCOUNTER — TELEPHONE (OUTPATIENT)
Dept: FAMILY MEDICINE CLINIC | Facility: CLINIC | Age: 79
End: 2024-12-26

## 2025-02-13 ENCOUNTER — PATIENT OUTREACH (OUTPATIENT)
Dept: FAMILY MEDICINE CLINIC | Facility: CLINIC | Age: 80
End: 2025-02-13

## 2025-04-14 ENCOUNTER — OFFICE VISIT (OUTPATIENT)
Dept: NEUROLOGY | Facility: CLINIC | Age: 80
End: 2025-04-14
Payer: MEDICARE

## 2025-04-14 VITALS
HEIGHT: 63 IN | RESPIRATION RATE: 16 BRPM | DIASTOLIC BLOOD PRESSURE: 60 MMHG | WEIGHT: 136 LBS | BODY MASS INDEX: 24.1 KG/M2 | HEART RATE: 65 BPM | SYSTOLIC BLOOD PRESSURE: 118 MMHG

## 2025-04-14 DIAGNOSIS — M54.50 LUMBAR PAIN WITH RADIATION DOWN LEFT LEG: Primary | ICD-10-CM

## 2025-04-14 DIAGNOSIS — M79.605 LUMBAR PAIN WITH RADIATION DOWN LEFT LEG: Primary | ICD-10-CM

## 2025-04-14 PROCEDURE — 1159F MED LIST DOCD IN RCRD: CPT | Performed by: OTHER

## 2025-04-14 PROCEDURE — 99214 OFFICE O/P EST MOD 30 MIN: CPT | Performed by: OTHER

## 2025-04-14 NOTE — PROGRESS NOTES
[facility] Progress Note    HPI  Chief Complaint   Patient presents with    Neurologic Problem     Radiating lumbar pain/RLS, completed PT and doing home exercises, notes improvement in severity of Sx's, can be exacerbated w/ \"sleeping positions\"       As per my initial H&P from 6/25/2024,   \" Sakshi Gao is a 79 year old, with PMHx including but not limited to osteopenia and hypothyroidism, who presents for evaluation of numbness and tingling in feet.  Patient states she noted gradual worsening of tingling in feet but worsening in the past year since being on Prolia for osteoporosis. She was previously feeling some numbness in feet but tingling in the past year. She denies tingling in hands and denies falls or significant balance issues or tripping over feet. She is on calcium supplement.  She was on gabapentin briefly when she had an episode of tingling / pain in the right neck and arm during pandemic but admits this resolved on own and was not able to tolerate gabapentin as she was drowsy on the medication.     Of note, she also has some neck pain.      Father had spinal stenosis.        Otherwise, patient denies any recent weight change, fevers, chills, nausea, double vision/ blurry vision / loss of vision, chest pain, palpitations, shortness of breath, rashes, joint pains, bowel / bladder incontinence or mood issues. \"       Prior notes as per 9/30/2024.  Patient since last visit has noted sensations in feet and legs mainly at night. She endorses some feeling of \"near cramping\" or compression in left more than right leg when lying down  and overall improves when walking; no bowel / bladder changes; has some left hip pain as well.        Prior notes as per 12/2/2024.  Patient last seen 9/30/2024. She has been taking gabapentin 100 mg intermittently at night and sleeps well when she takes this. She mainly notes pain in the left hip radiating down to left leg down to left foot, which is  worse when lying down. She denies bowel / bladder incontinence. She denies tripping over feet. She notes when she takes gabapentin she sleeps well but is drowsy in the morning as well; she has not been taking on regular basis.         Patient last seen 2024.  She tried to take gabapentin on regular basis but was not able to tolerate due to drowsiness. Otherwise, she has been working with PT and notes improvement in left hip pain radiating to left leg down to left foot; she denies bowel / bladder incontinence. She denies tripping over feet. She is overall feeling better with home exercises.         Past Medical History:    Allergic rhinitis    Anxiety    Atrophic vaginitis    Chronic pansinusitis    Corns and callosities    COVID-19    1/3/2022    Depression    Herpes zoster with other nervous system complications(053.19)    Hypothyroidism    acquired    Left bundle branch block (LBBB) on electrocardiogram    Left hand paresthesia    left middle, ring, 5th finger- tingling, no weakness or numbness. Resolved 2020. Cancel EMG appt 20.     Neoplasms of unspecified nature, other specified sites    Normal spontaneous vaginal delivery (HCC)    Osteoarthritis    Other malaise and fatigue    Other screening mammogram    Pap smear, as part of routine gynecological examination    Personal history of estrogen therapy    Seasonal allergies    Special screening for osteoporosis     Past Surgical History:   Procedure Laterality Date    Adenoidectomy      Cataract  2020    Colonoscopy  2003    Colonoscopy  3/12/13    Colonoscopy      Willa localization wire 1 site left (cpt=19281) Left     benign.    Willa localization wire 1 site right (cpt=19281) Right     benign.          Tonsillectomy       Family History   Problem Relation Age of Onset    Dementia Father     Diabetes Father         mellitus    Hypertension Father     Hypertension Mother     Breast Cancer Mother 93        brca at age 92, passed  away at age 96.  possible trauma as cause.    Other (Other) Mother     Diabetes Maternal Grandmother     Diabetes Paternal Grandmother     Cancer Brother 68        melanoma.     Diabetes Maternal Uncle      Social History     Socioeconomic History    Marital status:    Tobacco Use    Smoking status: Former     Current packs/day: 0.00     Average packs/day: 0.3 packs/day for 2.0 years (0.6 ttl pk-yrs)     Types: Cigarettes     Start date: 1962     Quit date: 1964     Years since quittin.3     Passive exposure: Never    Smokeless tobacco: Never    Tobacco comments:     I was in college, everyone smoked.   Vaping Use    Vaping status: Never Used   Substance and Sexual Activity    Alcohol use: Yes     Comment: Occasional glass of wine    Drug use: Never   Other Topics Concern    Caffeine Concern No    Stress Concern Yes    Weight Concern No    Special Diet No    Exercise Yes    Seat Belt Yes       Allergies   Allergen Reactions    Latex OTHER (SEE COMMENTS)    Sulfa Antibiotics HIVES    Sulfa Drugs Cross Reactors HIVES    Latex      Smell lingers, bothersome         Current Outpatient Medications:     LEVOTHYROXINE 100 MCG Oral Tab, TAKE 1 TABLET DAILY, Disp: 90 tablet, Rfl: 3    diclofenac 1 % External Gel, Apply 2 g topically 4 (four) times daily as needed. Buy otc, Disp: 150 g, Rfl: 3    hydrocortisone 2.5 % External Cream, Apply to Affected Area of face twice daily when flared, then as needed., Disp: , Rfl:     ketoconazole 2 % External Shampoo, Apply to scalp 3-4 times a week as needed. Let sit 2-3 minutes then rinse. Use regular shampoo and conditioner after., Disp: , Rfl:     Acetaminophen ER (TYLENOL 8 HOUR ARTHRITIS PAIN) 650 MG Oral Tab CR, Take 2 tablets (1,300 mg total) by mouth every 8 (eight) hours as needed for Pain (Max is 6 tablets in 24 hours or 4000 mg of Tylenol/acetaminophen in 24 hours). Buy over the counter, Disp: 180 tablet, Rfl: 0    Calcium Carbonate 1500 (600 Ca) MG Oral  Tab, Take 600 mg by mouth., Disp: , Rfl:     Melatonin 5 MG Oral Tab, 1/2 tab po nightly as needed, Disp: 30 tablet, Rfl: 0    Cholecalciferol (VITAMIN D) 2000 units Oral Cap, Take 1 capsule (2,000 Units total) by mouth daily. Adjusted on 6/3/2019 from 4000IU due to vit D level 70., Disp: 30 capsule, Rfl: 0    cetirizine 10 MG Oral Tab, Take 1 tablet (10 mg total) by mouth daily., Disp: , Rfl:     Multiple Vitamins-Minerals (EYE VITAMINS OR), Take  by mouth., Disp: , Rfl:     Multiple Vitamin (MULTI-VITAMIN OR), Take  by mouth., Disp: , Rfl:     Review of Systems:  No chest pain or palpitations; otherwise as noted in HPI.    Exam:  /60 (BP Location: Left arm, Patient Position: Sitting, Cuff Size: adult)   Pulse 65   Resp 16   Ht 63\"   Wt 136 lb (61.7 kg)   LMP 10/25/1998 (LMP Unknown)   BMI 24.09 kg/m²   Estimated body mass index is 24.09 kg/m² as calculated from the following:    Height as of this encounter: 63\".    Weight as of this encounter: 136 lb (61.7 kg).    Gen: well developed, well nourished, no acute distress  HEENT: normocephalic  Heart; normal S1/S2, regular rate and rhythm  Lungs: clear to auscultation bilaterally  Extremities: no edema, peripheral pulses intact    Neck: supple, full range of motion; no carotid bruits    Fundoscopic Exam: optic discs sharp bilaterally    Neuro:  Mental status:  Orientation: Alert and oriented to person, place, time  Speech Fluent and conversational    CN: PERRL, EOMI with no nystagmus, VFF, smile symmetric, sensation intact, tongue and palate midline, SCM intact, otherwise, CN 2-12 intact  Motor: 5/5 strength throughout, tone normal  DTR: 2+ symmetric throughout, toes downgoing bilaterally, no clonus  Sensory: intact to light touch throughout  Coord: FNF intact with no tremor or dysmetria; rapid alternating movements intact bilaterally  Romberg: absent  Gait: normal casual, heel, toe and tandem gait      Labs:  None new    Prior as noted  below    Component      Latest Ref Rng 10/1/2024   IRON, TOTAL      45 - 160 mcg/dL 122    IRON BINDING CAPACITY      250 - 450 mcg/dL (calc) 436    % SATURATION      16 - 45 % (calc) 28    Magnesium, Serum      1.5 - 2.5 mg/dL 2.5    FERRITIN      16 - 288 ng/mL 77          Imaging:  None new    Other procedures:     None new    Prior as noted below    NCS/EMG (8/8/2024):     Sensory NCS      Nerve / Sites Rec. Site Onset Lat Peak Lat NP Amp PP Amp Segments Distance Velocity Comment     ms ms µV µV  cm m/s    R Sural - (Antidromic)      Calf Ankle 2.97 3.85 10.1 13.7 Calf - Ankle 13 44    L Sural - (Antidromic)      Calf Ankle 3.28 4.17 13.4 18.2 Calf - Ankle 13 40        Motor NCS      Nerve / Sites Muscle Latency Amplitude Segments Dist. Lat Diff Velocity Comments     ms mV  cm ms m/s    R Peroneal - EDB      Ankle EDB 5.15 7.1 Ankle - EDB 7         B. Fib Head EDB 12.08 5.3 B. Fib Head - Ankle 31.5 6.94 45.4    L Peroneal - EDB      Ankle EDB 4.90 6.9 Ankle - EDB 7         B. Fib Head EDB 11.92 5.8 B. Fib Head - Ankle 30.5 7.02 43.4    R Tibial - AH      Ankle AH 4.63 7.4 Ankle - AH 8         Knee AH 13.31 5.2 Knee - Ankle 38 8.69 43.7    L Tibial - AH      Ankle AH 4.90 4.4 Ankle - AH 8         Knee AH 13.71 3.8 Knee - Ankle 38.5 8.81 43.7        F  Wave      Nerve M Latency F Latency    ms ms   R Peroneal - EDB 6.0 52.7   Ref.     R Tibial - AH 5.6 54.3   Ref.     L Tibial - AH 5.4 60.3   Ref.     L Peroneal - EDB 4.7 53.8   Ref.         EMG Summary Table     Spontaneous MUAP Recruitment   Muscle IA Fib PSW Fasc H.F. Amp Dur. PPP Pattern   R. Vastus medialis N None None None None N N N N   L. Vastus medialis N None None None None N N N N   R. Peroneus longus N None None None None N N N N   L. Peroneus longus N None None None None N N N N   R. Tibialis anterior N None None None None N N N N   L. Tibialis anterior N None None None None N N N N   R. Gastrocnemius 1+ None None 1+ None N N N N   L. Gastrocnemius 1+  None None 1+ None N N N N   R. Extensor hallucis longus N None None None None N N N N   L. Extensor hallucis longus N None None None None N N N N       Summary    The motor conduction test was unremarkable in all 4 of the tested nerves: R Peroneal - EDB, L Peroneal - EDB, R Tibial - AH, L Tibial - AH.    The sensory conduction test was unremarkable in all 2 of the tested nerves: R Sural - (Antidromic), L Sural - (Antidromic).    The F wave study was unremarkable in all 4 of the tested nerves: R Peroneal - EDB, R Tibial - AH, L Tibial - AH, L Peroneal - EDB    The needle EMG examination was performed in 10 muscles. It was normal in 8 muscle(s): R. Vastus medialis, L. Vastus medialis, R. Peroneus longus, L. Peroneus longus, R. Tibialis anterior, L. Tibialis anterior, R. Extensor hallucis longus, L. Extensor hallucis longus. The study was abnormal in 2 muscle(s), with the following distribution:  Abnormal spontaneous/insertional activity was found in R. Gastrocnemius, L. Gastrocnemius.        Conclusion:   This is a borderline study. There are isolated fasciculations in bilateral gastrocnemius muscles. This is an isolated finding and otherwise there is no evidence for a large fiber polyneuropathy, primary demyelinating neuropathy, myopathy or motor neuronopathy. Of note, this test would not rule out a small fiber neuropathy or central etiology. Clinical correlation is advised.            Impression/ Plan:  Sakshi Gao is a 79 year old  female with with PMHx including but not limited to osteopenia and hypothyroidism, who originally presented 6/25/2024 for evaluation of numbness and tingling in feet.  Patient states she noted gradual worsening of tingling in feet but worsening in the past year since being on Prolia for osteoporosis. She was previously feeling some numbness in feet but tingling in the past year. She denies tingling in hands and denies falls or significant balance issues or tripping over feet. She  is on calcium supplement.  She was on gabapentin briefly in the past but was not able to tolerate gabapentin as she was drowsy on the medication.       NCS/EMG done showed no evidence for neuropathy only showing isolated fasciculations in both gastrocnemius muscles, which is of unclear etiology but could be due to lumbar radiculopathy - her symptoms in legs primarily at night, however, with no evidence for neuropathy on NCS/EMG, could be due to restless legs syndrome as well; - magnesium and ferritin in normal range - she likely has combination of lumbar radiculopathy and/or sacroiliac disease and has been doing better since working with PT; she did not tolerate gabapentin and is now off this medication; recommend continue to follow with PCP and follow up periodically to evaluate for interval changes.     1. Lumbar pain with radiation down left leg  As noted above     Return in about 6 months (around 10/14/2025).    Cameron Jovel MD, Neurology  [facility]  Pager [phone]  4/14/2025

## 2025-04-14 NOTE — PATIENT INSTRUCTIONS
Refill policies:    Allow 2-3 business days for refills; controlled substances may take longer.  Contact your pharmacy at least 5 days prior to running out of medication and have them send an electronic request or submit request through the “request refill” option in your [employer] account.  Refills are not addressed on weekends; covering physicians do not authorize routine medications on weekends.  No narcotics or controlled substances are refilled after noon on Fridays or by on call physicians.  By law, narcotics must be electronically prescribed.  A 30 day supply with no refills is the maximum allowed.  If your prescription is due for a refill, you may be due for a follow up appointment.  To best provide you care, patients receiving routine medications need to be seen at least once a year.  Patients receiving narcotic/controlled substance medications need to be seen at least once every 3 months.  In the event that your preferred pharmacy does not have the requested medication in stock (e.g. Backordered), it is your responsibility to find another pharmacy that has the requested medication available.  We will gladly send a new prescription to that pharmacy at your request.    Scheduling Tests:    If your physician has ordered radiology tests such as MRI or CT scans, please contact Central Scheduling at [phone] right away to schedule the test.  Once scheduled, the [facility] Centralized Referral Team will work with your insurance carrier to obtain pre-certification or prior authorization.  Depending on your insurance carrier, approval may take 3-10 days.  It is highly recommended patients assure they have received an authorization before having a test performed.  If test is done without insurance authorization, patient may be responsible for the entire amount billed.      Precertification and Prior Authorizations:  If your physician has recommended that you have a procedure or additional testing performed the [facility]  Centralized Referral Team will contact your insurance carrier to obtain pre-certification or prior authorization.    You are strongly encouraged to contact your insurance carrier to verify that your procedure/test has been approved and is a COVERED benefit.  Although the [facility] Centralized Referral Team does its due diligence, the insurance carrier gives the disclaimer that \"Although the procedure is authorized, this does not guarantee payment.\"    Ultimately the patient is responsible for payment.   Thank you for your understanding in this matter.  Paperwork Completion:  If you require FMLA or disability paperwork for your recovery, please make sure to either drop it off or have it faxed to our office at [phone]. Be sure the form has your name and date of birth on it.  The form will be faxed to our Forms Department and they will complete it for you.  There is a 25$ fee for all forms that need to be filled out.  Please be aware there is a 10-14 day turnaround time.  You will need to sign a release of information (IVORY) form if your paperwork does not come with one.  You may call the Forms Department with any questions at [phone].  Their fax number is [phone].

## 2025-05-15 ENCOUNTER — TELEPHONE (OUTPATIENT)
Dept: FAMILY MEDICINE CLINIC | Facility: CLINIC | Age: 80
End: 2025-05-15

## 2025-05-15 NOTE — TELEPHONE ENCOUNTER
Patient went to a immediate care tested positive for covid and would like to know what doctor thinks about paxlovid should she take it.  It was prescribed to her but she is waiting to take it.

## 2025-05-15 NOTE — TELEPHONE ENCOUNTER
Pt. was seen in IC at [facility] and was prescribed Paxlovid on 05/15/25    Pt. wants to know if Dr. Arndt is okay with her taking paxlovid that she was prescribed for a Covid infection at . Pt. is wondering if this medication would be okay to take, due to her age.    Pt. was advised from IC doctor for pt. To verify with PCP if medication is safe to take due to her age.    Pt. stated that she already started the medication.    Please advise!

## 2025-05-19 NOTE — TELEPHONE ENCOUNTER
Patient may take Paxlovid.  She is not on a statin medication or blood thinners which commonly interacts with Paxlovid.    Hopefully she is feeling better by now.  Please follow-up with patient.

## 2025-05-20 NOTE — TELEPHONE ENCOUNTER
Pt. states feeling better and is not running a fever anymore. Pt. completed Paxlovid. Discussed Dr. Arndt's recommendations with pt.     Advised pt. to give us a call back with any further questions or concerns.

## 2025-06-02 ENCOUNTER — OFFICE VISIT (OUTPATIENT)
Dept: FAMILY MEDICINE CLINIC | Facility: CLINIC | Age: 80
End: 2025-06-02
Payer: MEDICARE

## 2025-06-02 VITALS
WEIGHT: 131.63 LBS | BODY MASS INDEX: 22.47 KG/M2 | HEIGHT: 64.3 IN | OXYGEN SATURATION: 98 % | HEART RATE: 72 BPM | RESPIRATION RATE: 18 BRPM | DIASTOLIC BLOOD PRESSURE: 68 MMHG | TEMPERATURE: 98 F | SYSTOLIC BLOOD PRESSURE: 132 MMHG

## 2025-06-02 DIAGNOSIS — Z12.31 SCREENING MAMMOGRAM, ENCOUNTER FOR: ICD-10-CM

## 2025-06-02 DIAGNOSIS — Z86.39 HISTORY OF VITAMIN D DEFICIENCY: ICD-10-CM

## 2025-06-02 DIAGNOSIS — Z00.00 ENCOUNTER FOR ANNUAL HEALTH EXAMINATION: Primary | ICD-10-CM

## 2025-06-02 DIAGNOSIS — M85.80 OSTEOPENIA WITH HIGH RISK OF FRACTURE: ICD-10-CM

## 2025-06-02 DIAGNOSIS — Z13.6 SCREENING, ISCHEMIC HEART DISEASE: ICD-10-CM

## 2025-06-02 DIAGNOSIS — Z86.16 HISTORY OF COVID-19: ICD-10-CM

## 2025-06-02 DIAGNOSIS — R45.86 MOOD CHANGES: ICD-10-CM

## 2025-06-02 DIAGNOSIS — E03.9 ACQUIRED HYPOTHYROIDISM: ICD-10-CM

## 2025-06-02 DIAGNOSIS — M54.2 NECK PAIN ON RIGHT SIDE: ICD-10-CM

## 2025-06-02 DIAGNOSIS — M21.619 BUNION OF GREAT TOE: ICD-10-CM

## 2025-06-02 DIAGNOSIS — M54.16 LUMBAR BACK PAIN WITH RADICULOPATHY AFFECTING LOWER EXTREMITY: ICD-10-CM

## 2025-06-02 PROCEDURE — G2211 COMPLEX E/M VISIT ADD ON: HCPCS | Performed by: FAMILY MEDICINE

## 2025-06-02 PROCEDURE — 99214 OFFICE O/P EST MOD 30 MIN: CPT | Performed by: FAMILY MEDICINE

## 2025-06-02 PROCEDURE — 96160 PT-FOCUSED HLTH RISK ASSMT: CPT | Performed by: FAMILY MEDICINE

## 2025-06-02 PROCEDURE — G0439 PPPS, SUBSEQ VISIT: HCPCS | Performed by: FAMILY MEDICINE

## 2025-06-02 RX ORDER — ALENDRONATE SODIUM 70 MG/1
70 TABLET ORAL
Qty: 4 TABLET | Refills: 3 | Status: SHIPPED | OUTPATIENT
Start: 2025-06-20

## 2025-06-02 RX ORDER — LEVOTHYROXINE SODIUM 100 UG/1
100 TABLET ORAL DAILY
Qty: 90 TABLET | Refills: 3 | Status: SHIPPED | OUTPATIENT
Start: 2025-06-02

## 2025-06-02 RX ORDER — ALENDRONATE SODIUM 70 MG/1
70 TABLET ORAL
Qty: 4 TABLET | Refills: 3 | Status: SHIPPED | OUTPATIENT
Start: 2025-06-20 | End: 2025-06-02

## 2025-06-02 NOTE — PROGRESS NOTES
Subjective:   Sakshi Gao is a 80 year old female who presents for a MA AHA (Medicare Advantage Annual Health Assessment) and Subsequent Annual Wellness visit (Pt already had Initial Annual Wellness) and addressed chronic concerns.         History of Present Illness  Ms. Yaneli Gao is an 80 year old female who presents for an annual checkup and medication monitoring.    She experienced COVID-19 on May 13th, 2025, and continues to have lingering symptoms such as fatigue and rhinorrhea. This is her third COVID-19 infection, and she feels it has been more challenging this time. She completed a course of Paxlovid and is up to date with her COVID-19 boosters.    She has been feeling tired and tearful over the past few weeks, attributing these symptoms to her recent COVID-19 infection. No hopelessness or thoughts of self-harm. She recalls a previous adverse reaction to sertraline and is hesitant to start new medications for her mood.No fever, cough, or body aches.  She had an adjustment disorder with depressive symptoms when she sold her house of 40 years in [place] and her granddaughter was going off to college whom she was close with and was treated with sertraline/SSRI and felt it made her numb and did not like taking it.  She feels this is situational since she has been sick and has not been able to be as active.  She is not interested in pursuing medication.  She denies any SI or HI or wanting to die.    Osteopenia with high fall risk on Prolia 60mg on 5/11/2023. No difficulty walking. On calcium and vit D.  Last DEXA was 12/11/2023 with osteopenia of femoral neck with T-score -1.3 with a 6.3% change from prior examination, total hip T-score -0.4 and lumbar T score 1.1.  FRAX score  12% for 10-year risk and decreased risk of fracture with hip at 2.8%.  Wants to discontinue Prolia due to hair loss and tingling in feet. States neurology told her she does not have RLS. Tried gabapentin cannot tolerate and  \"puts in a zone and cannot function\"- feels sedated. Takes tylenol at night with melatonin to help her sleep and \"works\".     She is concerned about her osteoporosis treatment with Prolia, noting side effects such as hair thinning and tingling in her feet. She has previously tried bisphosphonates like Fosamax and Boniva but experienced body aches. She is considering her options due to her busy summer schedule, 2-week cruise and her 60th wedding anniversary, and the side effects with medication she is experiencing.    She experiences tingling in both feet and discomfort in her left leg, particularly when lying down. She has been doing physical therapy since December 2024 ordered by neurologist Dr. Jovel, to strengthen her abdominal and lower back muscles. A specialist informed her that she does not have neuropathy and that her symptoms may be related to back issues. She takes Tylenol, melatonin, and allergy medication at night, which usually helps her sleep.    She reports a drooping sensation in her right shoulder and occasional pain that radiates from her shoulder to her neck, especially when performing tasks that require looking down. She has a history of neck arthritis and degenerative changes in the discs.  She would like physical therapy for her neck as well and states she will call her neurologist for an additional order.    Pt had a history of hypothyroidism and here to recheck. Has been tolerating the medication well.   She continues to manage her thyroid condition and is compliant with her medication regimen.   Last TSH -   TSH (mIU/L)   Date Value   06/13/2023 0.78   04/21/2022 1.86   06/13/2020 1.29   No shakiness, palpitations, increased BM's or wgt loss. Feels fatigued and sad, crying spells past 2 weeks since had Covid 19 beginning in May 2025.  Experienced weight loss with COVID.  Appetite is returned.  Wt Readings from Last 6 Encounters:   06/02/25 131 lb 9.6 oz (59.7 kg)   04/14/25 136 lb (61.7  kg)   12/20/24 136 lb 6.4 oz (61.9 kg)   12/02/24 135 lb (61.2 kg)   09/30/24 135 lb (61.2 kg)   06/25/24 135 lb (61.2 kg)     She experiences intermittent abdominal bloating and does not like that her stomach is not flat and slightly distended which she does attribute to aging.  Finds that perhaps certain foods make her bloated.  Is not fond of vegetables or does not eat broccoli or cauliflower or beings.  She has never tried a FODMAP diet to determine what foods can cause her bloating.  She is denying any abdominal pain her bowel movements are regular and brown there is no blood.  She has been experiencing this for several years.  Symptoms are unchanged.  She states she likes to know what is going on with her body.    No vaginal bleeding and she is up to date with her mammograms. She maintains a diet that includes chicken and meat but is not fond of vegetables.    She is looking forward to a busy summer with her 60th wedding anniversary, a family gathering, a niece's wedding, and an ocean cruise planned.          History/Other:   Fall Risk Assessment:   She has been screened for Falls and is low risk.      Cognitive Assessment:   She had a completely normal cognitive assessment - see flowsheet entries     Functional Ability/Status:   Sakshi Gao has a completely normal functional assessment. See flowsheet for details.      Depression Screening (PHQ):  PHQ-2 SCORE: 1  , done 5/29/2025   Feeling down, depressed, or hopeless: 1    Last [place] Suicide Screening on 6/2/2025 was No Risk.     <5 minutes spent screening and counseling for depression    Advanced Directives:   She has a Living Will on file in "[employer] reviewed and discussed documents with patient (and family/surrogate if present).  She has a Power of  for Health Care on file in "[employer].  Discussed Advance Care Planning with patient (and family/surrogate if present). Standard forms made available to patient in After Visit Summary.      Patient  Active Problem List   Diagnosis    Hypothyroidism    History of vitamin D deficiency    Vaginal atrophy    Bunion of great toe    Primary osteoarthritis of both hands    Osteopenia with high risk of fracture    DNR (do not resuscitate)    Slow transit constipation    Abdominal bloating    Neck pain on right side    Paresthesia of both feet    Periorbital cellulitis of right eye    Hordeolum externum of right lower eyelid    History of hordeolum    Left leg pain     Allergies:  She is allergic to latex, sulfa antibiotics, sulfa drugs cross reactors, and latex.    Current Medications:  Outpatient Medications Marked as Taking for the 6/2/25 encounter (Office Visit) with Acacia Arndt, DO   Medication Sig    LEVOTHYROXINE 100 MCG Oral Tab TAKE 1 TABLET DAILY    diclofenac 1 % External Gel Apply 2 g topically 4 (four) times daily as needed. Buy otc    hydrocortisone 2.5 % External Cream Apply to Affected Area of face twice daily when flared, then as needed.    ketoconazole 2 % External Shampoo Apply to scalp 3-4 times a week as needed. Let sit 2-3 minutes then rinse. Use regular shampoo and conditioner after.    Acetaminophen ER (TYLENOL 8 HOUR ARTHRITIS PAIN) 650 MG Oral Tab CR Take 2 tablets (1,300 mg total) by mouth every 8 (eight) hours as needed for Pain (Max is 6 tablets in 24 hours or 4000 mg of Tylenol/acetaminophen in 24 hours). Buy over the counter    Calcium Carbonate 1500 (600 Ca) MG Oral Tab Take 600 mg by mouth.    Melatonin 5 MG Oral Tab 1/2 tab po nightly as needed    Cholecalciferol (VITAMIN D) 2000 units Oral Cap Take 1 capsule (2,000 Units total) by mouth daily. Adjusted on 6/3/2019 from 4000IU due to vit D level 70.    cetirizine 10 MG Oral Tab Take 1 tablet (10 mg total) by mouth daily.    Multiple Vitamins-Minerals (EYE VITAMINS OR) Take  by mouth.    Multiple Vitamin (MULTI-VITAMIN OR) Take  by mouth.     Current Facility-Administered Medications for the 6/2/25 encounter (Office Visit) with  Acacia Arndt, DO   Medication    denosumab (Prolia) 60 MG/ML SUBQ injection 60 mg    denosumab (Prolia) 60 MG/ML SUBQ injection 60 mg    denosumab (Prolia) 60 MG/ML SUBQ injection 60 mg       Medical History:  She  has a past medical history of Allergic rhinitis (), Anxiety, Atrophic vaginitis (2012), Chronic pansinusitis (3/6/2023), Corns and callosities (2012), COVID-19 (1/3/2022), Depression, Herpes zoster with other nervous system complications(3.), Hypothyroidism (), Left bundle branch block (LBBB) on electrocardiogram (2012), Left hand paresthesia (2020), Neoplasms of unspecified nature, other specified sites, Normal spontaneous vaginal delivery (), Osteoarthritis, Other malaise and fatigue (12/10/2011), Other screening mammogram (), Pap smear, as part of routine gynecological examination (), Personal history of estrogen therapy, Seasonal allergies, and Special screening for osteoporosis (02/15/2011).  Surgical History:  She  has a past surgical history that includes colonoscopy (2003); tonsillectomy; colonoscopy (3/12/13); thierno localization wire 1 site left (cpt=19281) (Left, ); adenoidectomy; thierno localization wire 1 site right (cpt=19281) (Right, ); cataract (2020); colonoscopy; and .   Family History:  Her family history includes Breast Cancer (age of onset: 93) in her mother; Cancer (age of onset: 68) in her brother; Dementia in her father; Diabetes in her father, maternal grandmother, maternal uncle, and paternal grandmother; Hypertension in her father and mother; Other in her mother.  Social History:  She  reports that she quit smoking about 61 years ago. Her smoking use included cigarettes. She started smoking about 63 years ago. She has a 0.6 pack-year smoking history. She has never been exposed to tobacco smoke. She has never used smokeless tobacco. She reports current alcohol use. She reports that she does not use  drugs.    Tobacco:  She smoked tobacco in the past but quit greater than 12 months ago.  Social History     Tobacco Use   Smoking Status Former    Current packs/day: 0.00    Average packs/day: 0.3 packs/day for 2.0 years (0.6 ttl pk-yrs)    Types: Cigarettes    Start date: 1962    Quit date: 1964    Years since quittin.4    Passive exposure: Never   Smokeless Tobacco Never   Tobacco Comments    I was in college, everyone smoked.        CAGE Alcohol Screen:   CAGE screening score of 0 on 2025, showing low risk of alcohol abuse.      Patient Care Team:  Acacia Arndt DO as PCP - General (Family Practice)  Cameron Jovel MD (NEUROLOGY)    Review of Systems  GENERAL: feels well otherwise  SKIN: denies any unusual skin lesions  EYES: denies blurred vision or double vision  HEENT: denies nasal congestion, sinus pain or ST  LUNGS: denies shortness of breath with exertion  CARDIOVASCULAR: denies chest pain on exertion  GI: denies abdominal pain, denies heartburn  : denies dysuria, vaginal discharge or itching, no complaint of urinary incontinence   MUSCULOSKELETAL: denies back pain  NEURO: denies headaches  PSYCHE: mood changes denies depression or anxiety  HEMATOLOGIC: denies hx of anemia  ENDOCRINE: denies thyroid history  ALL/ASTHMA: denies hx of allergy or asthma    Objective:   Physical Exam  General Appearance:  Alert, cooperative, no distress, appears stated age   Head:  Normocephalic, without obvious abnormality, atraumatic   Eyes:  PERRL, conjunctiva/corneas clear, EOM's intact both eyes   Ears:  Normal TM's and external ear canals, both ears   Nose: Nares normal, septum midline,mucosa normal, no drainage or sinus tenderness   Throat: Lips, mucosa, and tongue normal; teeth and gums normal   Neck: Supple, symmetrical, trachea midline, no adenopathy;  thyroid: not enlarged, symmetric, no tenderness/mass/nodules; no carotid bruit or JVD   Back:   Symmetric, no curvature, ROM normal, no CVA  tenderness   Lungs:   Clear to auscultation bilaterally, respirations unlabored   Heart:  Regular rate and rhythm, S1 and S2 normal, no murmur, rub, or gallop   Abdomen:   Soft, non-tender, bowel sounds active all four quadrants,  no masses, no organomegaly   Pelvic: Deferred   Extremities: Extremities normal, atraumatic, no cyanosis or edema   Pulses: 2+ and symmetric   Skin: Skin color, texture, turgor normal, no rashes or lesions   Lymph nodes: Cervical, supraclavicular, and axillary nodes normal   Neurologic: Normal       /68 (BP Location: Left arm, Patient Position: Sitting, Cuff Size: adult)   Pulse 72   Temp 97.9 °F (36.6 °C) (Temporal)   Resp 18   Ht 5' 4.3\" (1.633 m)   Wt 131 lb 9.6 oz (59.7 kg)   LMP 10/25/1998 (LMP Unknown)   SpO2 98%   BMI 22.38 kg/m²  Estimated body mass index is 22.38 kg/m² as calculated from the following:    Height as of this encounter: 5' 4.3\" (1.633 m).    Weight as of this encounter: 131 lb 9.6 oz (59.7 kg).    Medicare Hearing Assessment:   Hearing Screening    Time taken: 6/2/2025  9:08 AM  Screening Method: Finger Rub  Finger Rub Result: Pass         Visual Acuity:   Right Eye Visual Acuity: Corrected Right Eye Chart Acuity: 20/20   Left Eye Visual Acuity: Corrected Left Eye Chart Acuity: 20/20   Both Eyes Visual Acuity: Corrected Both Eyes Chart Acuity: 20/20   Able To Tolerate Visual Acuity: Yes        Assessment & Plan:   Sakshi Gao is a 80 year old female who presents for a Medicare Assessment.     1. Encounter for annual health examination (Primary)  2. Acquired hypothyroidism  -     Detailed, Mod Complex (86838)  -     Levothyroxine Sodium; Take 1 tablet (100 mcg total) by mouth daily.  Dispense: 90 tablet; Refill: 3  -     CBC With Differential With Platelet  -     Assay, Thyroid Stim Hormone  -     Free T4, (Free Thyroxine)  3. Osteopenia with high risk of fracture  -     Detailed, Mod Complex (72933)  -     Comp Metabolic Panel (14)  -     CBC  With Differential With Platelet  -     Endocrine Referral - In Network  -   start  Alendronate Sodium; Take 1 tablet (70 mg total) by mouth every 7 days. Take Friday am on empty stomach and remain upright for at least 2 hours after ingestion.  Dispense: 4 tablet; Refill: 3  Risks and benefits of fosamax discussed including esophagitis, gastritis, esophageal or gastric ulcer, hypoglycemia, abdominal pain, flatulence, hypersensitivity reaction and David Leonardo syndrome/ toxic epidermal necrolysis GERD, dysphagia, jaw necrosis etc. call if any side effects or concerns with the medication ASAP.  Maintain upright position 1 hour after ingestion on an empty stomach and large glass of water.  Agrees to have dental cleaning and complete any dental work especially tooth extraction which is higher risk of jaw necrosis since inhibiting the remodeling process of healing bone with Fosamax.    - Discussed risk of vertebral fractures if transitioned off Prolia.  4. History of vitamin D deficiency  -     Detailed, Mod Complex (14529)  -     Vitamin D, 25-Hydroxy  5. Bunion of great toe-asymptomatic  -     Detailed, Mod Complex (47928)  6. Lumbar back pain with radiculopathy affecting lower extremity  7. Neck pain on right side  8. Mood changes  -     Comp Metabolic Panel (14)  -     CBC With Differential With Platelet  -     Assay, Thyroid Stim Hormone  -     Free T4, (Free Thyroxine)  9. History of COVID-19  10. Screening, ischemic heart disease  -     Lipid Panel  11. Screening mammogram, encounter for  -     [facility] FERNA[facility]O 2D+3D SCREENING BILAT (CPT=77067/02112); Future; Expected date: 06/02/2025    Assessment & Plan  History of COVID-19  Recent COVID-19 infection in May 2025 with persistent fatigue and rhinorrhea. No fever, cough, or myalgia. Discussed post-infection immunity and advised waiting 90 days before next booster. Elie remains uninfected and should receive the booster before the cruise.  - Advise waiting 90 days before  next COVID-19 booster.  - Recommend Elie to get the COVID-19 booster before the cruise.    Mood changes-mild depression  Recent onset of tearfulness and low mood, likely related to recent COVID-19 infection. No hopelessness or suicidal ideation. Previous sertraline trial was poorly tolerated. Symptoms to be reassessed after thyroid function test results.  - Reassess symptoms after thyroid function test results.  - Consider further treatment if symptoms persist.  - Refusing any intervention medication or counseling.  Feels this is temporary and situational.  She agrees if her symptoms do not resolve in the next 2 weeks she will return to the office    Thyroid disorder  Thyroid disorder with current symptoms of fatigue and tearfulness. Previous thyroid medication trial noted. Labs ordered to assess current thyroid function.  She denies missing any doses of thyroid medication  - Order thyroid function tests and complete blood count.  - Will continue on levothyroxine 100 mcg at current dose for now    Osteoporosis  Currently on Prolia with concerns about side effects including alopecia and paresthesia. Discussed potential switch to bisphosphonates (Fosamax or Boniva) due to side effects. Risks of discontinuing Prolia without bridging therapy include rapid bone loss and vertebral fractures. Consideration of switching to weekly alendronate to minimize potential discomfort during a busy summer.  - Switch from Prolia to weekly alendronate starting June 20th.  - Advise taking alendronate on an empty stomach and remain upright for 2 hours post-ingestion.  - Consider referral to endocrinologist for further management if needed if experiencing side effects with Fosamax.  Understands must be on a bisphosphonate following Prolia.  - Dental precautions are identical with tooth extraction with alendronate and Prolia there is risk of jaw necrosis which is unchanged with new medication.    Chronic back and neck pain-most likely  causing leg pain and tingling  Bilateral leg paresthesia and left leg pain, exacerbated nocturnally. Previous evaluation by Dr. Jovel ruled out neuropathy. Gabapentin was not tolerated due to side effects. Current management includes acetaminophen, melatonin, and allergy medication. Magnesium glycinate suggested for leg cramps and sleep aid.  - Recommend magnesium glycinate 120 mg, two tablets an hour before bedtime.  - Continue physical therapy  - Patient wants physical therapy for her right neck and will call neurologist for referral    The patient indicates understanding of these issues and agrees to the plan.  Imaging studies ordered.  Lab work ordered.  Prescription medication ordered.  Reinforced healthy diet, lifestyle, and exercise.      No follow-ups on file.     Acacia Arndt DO, 6/2/2025     Supplementary Documentation:   General Health:  In the past six months, have you lost more than 10 pounds without trying?: (Patient-Rptd) 2 - No  Has your appetite been poor?: (Patient-Rptd) No  Type of Diet: (Patient-Rptd) Balanced  How does the patient maintain a good energy level?: (Patient-Rptd) Appropriate Exercise, Stretching  How would you describe your daily physical activity?: (Patient-Rptd) Moderate  How would you describe your current health state?: (Patient-Rptd) Good  How do you maintain positive mental well-being?: (Patient-Rptd) Social Interaction, Puzzles, Games, Visiting Friends, Visiting Family  On a scale of 0 to 10, with 0 being no pain and 10 being severe pain, what is your pain level?: (Patient-Rptd) 3 - (Mild)  In the past six months, have you experienced urine leakage?: (Patient-Rptd) 0-No  At any time do you feel concerned for the safety/well-being of yourself and/or your children, in your home or elsewhere?: (Patient-Rptd) No  Have you had any immunizations at another office such as Influenza, Hepatitis B, Tetanus, or Pneumococcal?: (Patient-Rptd) No    Health Maintenance   Topic Date Due     Annual Well Visit  01/01/2025    COVID-19 Vaccine (8 - 2024-25 season) 05/15/2025    Influenza Vaccine  Completed    DEXA Scan  Completed    Annual Depression Screening  Completed    Fall Risk Screening (Annual)  Completed    Pneumococcal Vaccine: 50+ Years  Completed    Zoster Vaccines  Completed    Meningococcal B Vaccine  Aged Out

## 2025-06-05 LAB
ABSOLUTE BASOPHILS: 29 CELLS/UL (ref 0–200)
ABSOLUTE EOSINOPHILS: 109 CELLS/UL (ref 15–500)
ABSOLUTE LYMPHOCYTES: 1961 CELLS/UL (ref 850–3900)
ABSOLUTE MONOCYTES: 449 CELLS/UL (ref 200–950)
ABSOLUTE NEUTROPHILS: 1651 CELLS/UL (ref 1500–7800)
ALBUMIN/GLOBULIN RATIO: 2 (CALC) (ref 1–2.5)
ALBUMIN: 4.4 G/DL (ref 3.6–5.1)
ALKALINE PHOSPHATASE: 46 U/L (ref 37–153)
ALT: 15 U/L (ref 6–29)
AST: 18 U/L (ref 10–35)
BASOPHILS: 0.7 %
BILIRUBIN, TOTAL: 0.8 MG/DL (ref 0.2–1.2)
BUN: 13 MG/DL (ref 7–25)
CALCIUM: 9.4 MG/DL (ref 8.6–10.4)
CARBON DIOXIDE: 28 MMOL/L (ref 20–32)
CHLORIDE: 103 MMOL/L (ref 98–110)
CHOL/HDLC RATIO: 2.6 (CALC)
CHOLESTEROL, TOTAL: 157 MG/DL
CREATININE: 0.67 MG/DL (ref 0.6–0.95)
EGFR: 87 ML/MIN/1.73M2
EOSINOPHILS: 2.6 %
GLOBULIN: 2.2 G/DL (CALC) (ref 1.9–3.7)
GLUCOSE: 87 MG/DL (ref 65–99)
HDL CHOLESTEROL: 60 MG/DL
HEMATOCRIT: 40.5 % (ref 35–45)
HEMOGLOBIN: 12.6 G/DL (ref 11.7–15.5)
LDL-CHOLESTEROL: 79 MG/DL (CALC)
LYMPHOCYTES: 46.7 %
MCH: 29.9 PG (ref 27–33)
MCHC: 31.1 G/DL (ref 32–36)
MCV: 96.2 FL (ref 80–100)
MONOCYTES: 10.7 %
MPV: 10.4 FL (ref 7.5–12.5)
NEUTROPHILS: 39.3 %
NON-HDL CHOLESTEROL: 97 MG/DL (CALC)
PLATELET COUNT: 215 THOUSAND/UL (ref 140–400)
POTASSIUM: 4.3 MMOL/L (ref 3.5–5.3)
PROTEIN, TOTAL: 6.6 G/DL (ref 6.1–8.1)
RDW: 12.1 % (ref 11–15)
RED BLOOD CELL COUNT: 4.21 MILLION/UL (ref 3.8–5.1)
SODIUM: 139 MMOL/L (ref 135–146)
T4, FREE: 1.3 NG/DL (ref 0.8–1.8)
TRIGLYCERIDES: 95 MG/DL
TSH: 1.82 MIU/L (ref 0.4–4.5)
VITAMIN D, 25-OH, TOTAL: 62 NG/ML (ref 30–100)
WHITE BLOOD CELL COUNT: 4.2 THOUSAND/UL (ref 3.8–10.8)

## 2025-08-15 ENCOUNTER — PATIENT MESSAGE (OUTPATIENT)
Dept: FAMILY MEDICINE CLINIC | Facility: CLINIC | Age: 80
End: 2025-08-15

## 2025-08-15 DIAGNOSIS — M85.80 OSTEOPENIA WITH HIGH RISK OF FRACTURE: ICD-10-CM

## 2025-08-23 RX ORDER — ONDANSETRON 8 MG/1
8 TABLET, FILM COATED ORAL EVERY 8 HOURS PRN
Qty: 30 TABLET | Refills: 0 | Status: SHIPPED | OUTPATIENT
Start: 2025-08-23

## (undated) DIAGNOSIS — M85.80 OSTEOPENIA WITH HIGH RISK OF FRACTURE: ICD-10-CM

## (undated) NOTE — LETTER
10/07/20        Peggy Clark  [address]      Dear Monet Arzate,    [address] [facility] records indicate that you have outstanding lab work and or testing that was ordered for you and has not yet been completed:        Willa Garciao 2d+3d screen b

## (undated) NOTE — LETTER
01/08/19        Oziel Herrera  [address] [facility] [facility]      Dear Ian Parker,    [address] [facility] records indicate that you have outstanding lab work and or testing that was ordered for you and has not yet been completed:  Orders Placed This Encounter

## (undated) NOTE — LETTER
07/11/19        Lidia Sires  [address] [facility]      Dear Eliz Rod,    [address] [facility] records indicate that you have outstanding lab work and or testing that was ordered for you and has not yet been completed:  Orders Placed This Encounter

## (undated) NOTE — LETTER
07/09/21        Geovanny Alfred  [address] [facility]      Dear Erasto Mendez,    [address] [facility] records indicate that you have outstanding lab work and or testing that was ordered for you and has not yet been completed            TSH and Free T4

## (undated) NOTE — MR AVS SNAPSHOT
[facility] [place]  [address] [id number] [phone]               Thank you for choosing us for your health care visit with Authur Gosselin, DO. We are glad to serve you and happy to provide you with this summary of your visit.   Pl swelling. Ice, aspirin, acetaminophen, or ibuprofen may help relieve mild symptoms that occur after activity. Surgery and bone trimming  To ease movement and reduce pain, your doctor may trim damaged bone.  If arthritis is severe, the joint may be fused or 118/60 mmHg 68 98.3 °F (36.8 °C) 146 lb           Current Medications          This list is accurate as of: 4/4/17  2:27 PM.  Always use your most recent med list.                Calcium 600 MG Tabs   Take 500 mg by mouth daily.            * Estradiol 0.1 - Levothyroxine Sodium 88 MCG Tabs  - methylPREDNISolone 4 MG Tabs            MyChart     Visit MyChart  You can access your MyChart to more actively manage your health care and view more details from this visit by going to [email or web address].  I

## (undated) NOTE — MR AVS SNAPSHOT
[facility] [place]  [address] [address]  [phone]               Thank you for choosing us for your health care visit with Olga Salazar DO. We are glad to serve you and happy to provide you with this summary of your visit.   Pl FU in 6 months sooner if labs or dexa is abnormal    Please follow up with podiatry: as needed  Cesar Worthy, [address].  [address], [facility]  Fax: [phone] screening covered service except at the Welcome to Medicare Visit    Abdominal aortic aneurysm screening (once between ages 73-68) IPPE only No results found for this or any previous visit.  Limited to patients who meet one of the following criteria:   • Me flowsheet data found.     Screening Mammogram      Mammogram    Recommend Annually to at least age 76, and as needed after 76 Mammogram,1 Yr due on 08/25/2017 Please get this Mammogram regularly   Immunizations      Influenza  Covered Annually   Orders plac anyone to review and print using their home computer and printer. (the forms are also available in [address])  [email or web address]  This link also has information from the [facility] regarding Advance Directives.        Allergies as of M Visits < Visit Summaries. MyChart questions? Call [phone] for help. MyChart is NOT to be used for urgent needs. For medical emergencies, dial 911.         Educational Information     Your blood pressure indicates you may be at-risk for Hypertens

## (undated) NOTE — Clinical Note
06/08/2017        Nata Cobos  [address] [facility]      [facility],    [address] [facility] records indicate that you have outstanding lab work and or testing that was ordered for you and has not yet been completed:      LIPID PANEL  Comp Metab